# Patient Record
Sex: FEMALE | URBAN - METROPOLITAN AREA
[De-identification: names, ages, dates, MRNs, and addresses within clinical notes are randomized per-mention and may not be internally consistent; named-entity substitution may affect disease eponyms.]

---

## 2020-01-02 ENCOUNTER — TELEPHONE (OUTPATIENT)
Dept: HEMATOLOGY ONCOLOGY | Facility: CLINIC | Age: 65
End: 2020-01-02

## 2020-01-02 NOTE — TELEPHONE ENCOUNTER
New Patient Encounter    New Patient Intake Form   Patient Details:  Blanca Severino  1955  [id number]    Background Information:  [address] starts by opening a telephone encounter and gathering the following information   Who is calling to schedule? If not self, relationship to patient? self   Referring Provider Cristino Chatman [facility])   What is the diagnosis? Multiple abn lab values   Is this diagnosis confirmed NA   Is there a confirmed diagnosis from a biopsy/tissue reviewed by pathology? NA   Is there any prior history of Cancer? NA   If yes, please explain    When was the diagnosis? 11/2019   Is patient aware of diagnosis? Yes   Reason for visit? NP DX   Have you had any testing done? If so: when, where? Yes   Are records in EPIC? Yes, sent to [facility]   Was the patient told to bring a disk? no   Scheduling Information:   Preferred [place]:  Any     Requesting Specific Provider? no   Are there any dates/time the patient cannot be seen? no      Miscellaneous: Pt has Tania Conner  I called provider services, josie guadarramaed that she is in [facility] and can be seen in our office   After completing the above information, please route to Financial Counselor and the appropriate Nurse Navigator for review

## 2020-01-10 NOTE — TELEPHONE ENCOUNTER
I have patient records, they have been scanned to [facility]  I left  for pt to call me to schedule this appt

## 2024-01-25 ENCOUNTER — APPOINTMENT (EMERGENCY)
Dept: CT IMAGING | Facility: HOSPITAL | Age: 69
DRG: 644 | End: 2024-01-25
Payer: COMMERCIAL

## 2024-01-25 ENCOUNTER — HOSPITAL ENCOUNTER (INPATIENT)
Facility: HOSPITAL | Age: 69
LOS: 4 days | Discharge: HOME HEALTH | DRG: 644 | End: 2024-01-29
Attending: EMERGENCY MEDICINE | Admitting: STUDENT IN AN ORGANIZED HEALTH CARE EDUCATION/TRAINING PROGRAM
Payer: COMMERCIAL

## 2024-01-25 DIAGNOSIS — E87.1 HYPONATREMIA: Primary | ICD-10-CM

## 2024-01-25 DIAGNOSIS — K62.5 RECTAL BLEEDING: ICD-10-CM

## 2024-01-25 DIAGNOSIS — E22.2 SIADH (SYNDROME OF INAPPROPRIATE ADH PRODUCTION) (HCC): ICD-10-CM

## 2024-01-25 PROBLEM — R74.8 ABNORMAL LIVER ENZYMES: Status: ACTIVE | Noted: 2024-01-25

## 2024-01-25 PROBLEM — I10 ESSENTIAL HYPERTENSION: Status: ACTIVE | Noted: 2018-01-15

## 2024-01-25 PROBLEM — G43.709 CHRONIC MIGRAINE WITHOUT AURA WITHOUT STATUS MIGRAINOSUS, NOT INTRACTABLE: Status: ACTIVE | Noted: 2018-01-15

## 2024-01-25 PROBLEM — R79.89 ELEVATED TROPONIN LEVEL NOT DUE TO ACUTE CORONARY SYNDROME: Status: ACTIVE | Noted: 2022-06-22

## 2024-01-25 PROBLEM — E87.6 HYPOKALEMIA: Status: ACTIVE | Noted: 2023-03-12

## 2024-01-25 PROBLEM — E55.9 VITAMIN D DEFICIENCY: Status: ACTIVE | Noted: 2022-06-24

## 2024-01-25 PROBLEM — E83.42 HYPOMAGNESEMIA: Status: ACTIVE | Noted: 2023-03-12

## 2024-01-25 PROBLEM — R04.0 EPISTAXIS: Status: ACTIVE | Noted: 2024-01-25

## 2024-01-25 PROBLEM — F41.9 ANXIETY: Status: ACTIVE | Noted: 2018-01-15

## 2024-01-25 PROBLEM — G89.29 CHRONIC PAIN: Status: ACTIVE | Noted: 2018-01-15

## 2024-01-25 LAB
2HR DELTA HS TROPONIN: 4 NG/L
ALBUMIN SERPL BCP-MCNC: 4.1 G/DL (ref 3.5–5)
ALP SERPL-CCNC: 104 U/L (ref 34–104)
ALT SERPL W P-5'-P-CCNC: 51 U/L (ref 7–52)
ANION GAP SERPL CALCULATED.3IONS-SCNC: 15 MMOL/L
ANION GAP SERPL CALCULATED.3IONS-SCNC: 17 MMOL/L
APTT PPP: 32 SECONDS (ref 23–37)
AST SERPL W P-5'-P-CCNC: 133 U/L (ref 13–39)
BASOPHILS # BLD AUTO: 0.01 THOUSANDS/ÂΜL (ref 0–0.1)
BASOPHILS NFR BLD AUTO: 0 % (ref 0–1)
BILIRUB SERPL-MCNC: 1.58 MG/DL (ref 0.2–1)
BNP SERPL-MCNC: 64 PG/ML (ref 0–100)
BUN SERPL-MCNC: 12 MG/DL (ref 5–25)
BUN SERPL-MCNC: 9 MG/DL (ref 5–25)
CALCIUM SERPL-MCNC: 7.9 MG/DL (ref 8.4–10.2)
CALCIUM SERPL-MCNC: 8.6 MG/DL (ref 8.4–10.2)
CARDIAC TROPONIN I PNL SERPL HS: 17 NG/L
CARDIAC TROPONIN I PNL SERPL HS: 21 NG/L
CHLORIDE SERPL-SCNC: 89 MMOL/L (ref 96–108)
CHLORIDE SERPL-SCNC: 92 MMOL/L (ref 96–108)
CO2 SERPL-SCNC: 14 MMOL/L (ref 21–32)
CO2 SERPL-SCNC: 15 MMOL/L (ref 21–32)
CREAT SERPL-MCNC: 0.78 MG/DL (ref 0.6–1.3)
CREAT SERPL-MCNC: 0.89 MG/DL (ref 0.6–1.3)
EOSINOPHIL # BLD AUTO: 0 THOUSAND/ÂΜL (ref 0–0.61)
EOSINOPHIL NFR BLD AUTO: 0 % (ref 0–6)
ERYTHROCYTE [DISTWIDTH] IN BLOOD BY AUTOMATED COUNT: 12.4 % (ref 11.6–15.1)
ETHANOL SERPL-MCNC: 43 MG/DL
FLUAV RNA RESP QL NAA+PROBE: NEGATIVE
FLUBV RNA RESP QL NAA+PROBE: NEGATIVE
GFR SERPL CREATININE-BSD FRML MDRD: 67 ML/MIN/1.73SQ M
GFR SERPL CREATININE-BSD FRML MDRD: 78 ML/MIN/1.73SQ M
GLUCOSE SERPL-MCNC: 105 MG/DL (ref 65–140)
GLUCOSE SERPL-MCNC: 115 MG/DL (ref 65–140)
HCT VFR BLD AUTO: 36 % (ref 34.8–46.1)
HGB BLD-MCNC: 13.1 G/DL (ref 11.5–15.4)
IMM GRANULOCYTES # BLD AUTO: 0.02 THOUSAND/UL (ref 0–0.2)
IMM GRANULOCYTES NFR BLD AUTO: 1 % (ref 0–2)
INR PPP: 1 (ref 0.84–1.19)
LACTATE SERPL-SCNC: 4.4 MMOL/L (ref 0.5–2)
LIPASE SERPL-CCNC: 38 U/L (ref 11–82)
LYMPHOCYTES # BLD AUTO: 0.97 THOUSANDS/ÂΜL (ref 0.6–4.47)
LYMPHOCYTES NFR BLD AUTO: 26 % (ref 14–44)
MCH RBC QN AUTO: 34.4 PG (ref 26.8–34.3)
MCHC RBC AUTO-ENTMCNC: 36.4 G/DL (ref 31.4–37.4)
MCV RBC AUTO: 95 FL (ref 82–98)
MONOCYTES # BLD AUTO: 0.34 THOUSAND/ÂΜL (ref 0.17–1.22)
MONOCYTES NFR BLD AUTO: 9 % (ref 4–12)
NEUTROPHILS # BLD AUTO: 2.39 THOUSANDS/ÂΜL (ref 1.85–7.62)
NEUTS SEG NFR BLD AUTO: 64 % (ref 43–75)
NRBC BLD AUTO-RTO: 0 /100 WBCS
PLATELET # BLD AUTO: 110 THOUSANDS/UL (ref 149–390)
PLATELET # BLD AUTO: 122 THOUSANDS/UL (ref 149–390)
PMV BLD AUTO: 9.1 FL (ref 8.9–12.7)
PMV BLD AUTO: 9.6 FL (ref 8.9–12.7)
POTASSIUM SERPL-SCNC: 3.5 MMOL/L (ref 3.5–5.3)
POTASSIUM SERPL-SCNC: 3.7 MMOL/L (ref 3.5–5.3)
PROT SERPL-MCNC: 6.9 G/DL (ref 6.4–8.4)
PROTHROMBIN TIME: 13.8 SECONDS (ref 11.6–14.5)
RBC # BLD AUTO: 3.81 MILLION/UL (ref 3.81–5.12)
RSV RNA RESP QL NAA+PROBE: NEGATIVE
SARS-COV-2 RNA RESP QL NAA+PROBE: NEGATIVE
SODIUM SERPL-SCNC: 120 MMOL/L (ref 135–147)
SODIUM SERPL-SCNC: 122 MMOL/L (ref 135–147)
TSH SERPL DL<=0.05 MIU/L-ACNC: 2 UIU/ML (ref 0.45–4.5)
WBC # BLD AUTO: 3.73 THOUSAND/UL (ref 4.31–10.16)

## 2024-01-25 PROCEDURE — 80053 COMPREHEN METABOLIC PANEL: CPT | Performed by: EMERGENCY MEDICINE

## 2024-01-25 PROCEDURE — 99284 EMERGENCY DEPT VISIT MOD MDM: CPT | Performed by: EMERGENCY MEDICINE

## 2024-01-25 PROCEDURE — 83880 ASSAY OF NATRIURETIC PEPTIDE: CPT

## 2024-01-25 PROCEDURE — 74176 CT ABD & PELVIS W/O CONTRAST: CPT

## 2024-01-25 PROCEDURE — 85610 PROTHROMBIN TIME: CPT | Performed by: EMERGENCY MEDICINE

## 2024-01-25 PROCEDURE — 96361 HYDRATE IV INFUSION ADD-ON: CPT

## 2024-01-25 PROCEDURE — 84443 ASSAY THYROID STIM HORMONE: CPT

## 2024-01-25 PROCEDURE — 85025 COMPLETE CBC W/AUTO DIFF WBC: CPT | Performed by: EMERGENCY MEDICINE

## 2024-01-25 PROCEDURE — 82077 ASSAY SPEC XCP UR&BREATH IA: CPT

## 2024-01-25 PROCEDURE — 83690 ASSAY OF LIPASE: CPT | Performed by: EMERGENCY MEDICINE

## 2024-01-25 PROCEDURE — 80048 BASIC METABOLIC PNL TOTAL CA: CPT

## 2024-01-25 PROCEDURE — 93005 ELECTROCARDIOGRAM TRACING: CPT

## 2024-01-25 PROCEDURE — 1124F ACP DISCUSS-NO DSCNMKR DOCD: CPT | Performed by: EMERGENCY MEDICINE

## 2024-01-25 PROCEDURE — 94762 N-INVAS EAR/PLS OXIMTRY CONT: CPT

## 2024-01-25 PROCEDURE — 83930 ASSAY OF BLOOD OSMOLALITY: CPT

## 2024-01-25 PROCEDURE — G1004 CDSM NDSC: HCPCS

## 2024-01-25 PROCEDURE — 99223 1ST HOSP IP/OBS HIGH 75: CPT | Performed by: STUDENT IN AN ORGANIZED HEALTH CARE EDUCATION/TRAINING PROGRAM

## 2024-01-25 PROCEDURE — 85049 AUTOMATED PLATELET COUNT: CPT

## 2024-01-25 PROCEDURE — 99285 EMERGENCY DEPT VISIT HI MDM: CPT

## 2024-01-25 PROCEDURE — 83605 ASSAY OF LACTIC ACID: CPT | Performed by: STUDENT IN AN ORGANIZED HEALTH CARE EDUCATION/TRAINING PROGRAM

## 2024-01-25 PROCEDURE — 96374 THER/PROPH/DIAG INJ IV PUSH: CPT

## 2024-01-25 PROCEDURE — 84484 ASSAY OF TROPONIN QUANT: CPT | Performed by: EMERGENCY MEDICINE

## 2024-01-25 PROCEDURE — 85730 THROMBOPLASTIN TIME PARTIAL: CPT | Performed by: EMERGENCY MEDICINE

## 2024-01-25 PROCEDURE — 36415 COLL VENOUS BLD VENIPUNCTURE: CPT | Performed by: EMERGENCY MEDICINE

## 2024-01-25 PROCEDURE — 0241U HB NFCT DS VIR RESP RNA 4 TRGT: CPT | Performed by: EMERGENCY MEDICINE

## 2024-01-25 RX ORDER — METOCLOPRAMIDE HYDROCHLORIDE 5 MG/ML
10 INJECTION INTRAMUSCULAR; INTRAVENOUS EVERY 6 HOURS PRN
Status: DISCONTINUED | OUTPATIENT
Start: 2024-01-25 | End: 2024-01-29 | Stop reason: HOSPADM

## 2024-01-25 RX ORDER — METOPROLOL SUCCINATE 25 MG/1
25 TABLET, EXTENDED RELEASE ORAL 3 TIMES DAILY
Status: DISCONTINUED | OUTPATIENT
Start: 2024-01-25 | End: 2024-01-29 | Stop reason: HOSPADM

## 2024-01-25 RX ORDER — SERTRALINE HYDROCHLORIDE 100 MG/1
100 TABLET, FILM COATED ORAL
COMMUNITY

## 2024-01-25 RX ORDER — CALCIUM CARBONATE 500 MG/1
1000 TABLET, CHEWABLE ORAL DAILY PRN
Status: DISCONTINUED | OUTPATIENT
Start: 2024-01-25 | End: 2024-01-29 | Stop reason: HOSPADM

## 2024-01-25 RX ORDER — ACETAMINOPHEN 325 MG/1
650 TABLET ORAL EVERY 6 HOURS PRN
Status: DISCONTINUED | OUTPATIENT
Start: 2024-01-25 | End: 2024-01-29 | Stop reason: HOSPADM

## 2024-01-25 RX ORDER — METOCLOPRAMIDE HYDROCHLORIDE 5 MG/ML
10 INJECTION INTRAMUSCULAR; INTRAVENOUS ONCE
Status: COMPLETED | OUTPATIENT
Start: 2024-01-25 | End: 2024-01-25

## 2024-01-25 RX ORDER — LEFLUNOMIDE 10 MG/1
10 TABLET ORAL
COMMUNITY

## 2024-01-25 RX ORDER — ONDANSETRON 2 MG/ML
4 INJECTION INTRAMUSCULAR; INTRAVENOUS EVERY 6 HOURS PRN
Status: DISCONTINUED | OUTPATIENT
Start: 2024-01-25 | End: 2024-01-25

## 2024-01-25 RX ORDER — CITALOPRAM 20 MG/1
40 TABLET ORAL DAILY
Status: DISCONTINUED | OUTPATIENT
Start: 2024-01-26 | End: 2024-01-26

## 2024-01-25 RX ORDER — AMLODIPINE BESYLATE 10 MG/1
10 TABLET ORAL DAILY
Status: DISCONTINUED | OUTPATIENT
Start: 2024-01-26 | End: 2024-01-29 | Stop reason: HOSPADM

## 2024-01-25 RX ORDER — FOLIC ACID 1 MG/1
1 TABLET ORAL DAILY
Status: DISCONTINUED | OUTPATIENT
Start: 2024-01-26 | End: 2024-01-29 | Stop reason: HOSPADM

## 2024-01-25 RX ORDER — ACETAMINOPHEN 10 MG/ML
1000 INJECTION, SOLUTION INTRAVENOUS ONCE
Status: COMPLETED | OUTPATIENT
Start: 2024-01-25 | End: 2024-01-25

## 2024-01-25 RX ORDER — ECHINACEA PURPUREA EXTRACT 125 MG
1 TABLET ORAL
Status: DISCONTINUED | OUTPATIENT
Start: 2024-01-25 | End: 2024-01-29 | Stop reason: HOSPADM

## 2024-01-25 RX ORDER — CITALOPRAM 40 MG/1
40 TABLET ORAL DAILY
COMMUNITY
Start: 2023-09-12 | End: 2024-01-29

## 2024-01-25 RX ORDER — AMLODIPINE BESYLATE 10 MG/1
10 TABLET ORAL DAILY
COMMUNITY
Start: 2023-09-12

## 2024-01-25 RX ORDER — PANTOPRAZOLE SODIUM 40 MG/1
1 TABLET, DELAYED RELEASE ORAL DAILY
COMMUNITY
Start: 2023-08-17

## 2024-01-25 RX ORDER — BUTALBITAL, ACETAMINOPHEN AND CAFFEINE 50; 325; 40 MG/1; MG/1; MG/1
1 TABLET ORAL EVERY 6 HOURS PRN
Status: ACTIVE | OUTPATIENT
Start: 2024-01-25 | End: 2024-01-26

## 2024-01-25 RX ORDER — SOD.CHLORID/POTASSIUM CHLORIDE 287-180-15
2 TABLET ORAL 2 TIMES DAILY
COMMUNITY
Start: 2023-11-13 | End: 2024-01-29

## 2024-01-25 RX ORDER — DOCUSATE SODIUM 100 MG/1
100 CAPSULE, LIQUID FILLED ORAL 2 TIMES DAILY
Status: DISCONTINUED | OUTPATIENT
Start: 2024-01-25 | End: 2024-01-29

## 2024-01-25 RX ORDER — ENOXAPARIN SODIUM 100 MG/ML
40 INJECTION SUBCUTANEOUS DAILY
Status: DISCONTINUED | OUTPATIENT
Start: 2024-01-26 | End: 2024-01-29 | Stop reason: HOSPADM

## 2024-01-25 RX ORDER — LANOLIN ALCOHOL/MO/W.PET/CERES
100 CREAM (GRAM) TOPICAL DAILY
Status: DISCONTINUED | OUTPATIENT
Start: 2024-01-26 | End: 2024-01-29 | Stop reason: HOSPADM

## 2024-01-25 RX ORDER — SERTRALINE HYDROCHLORIDE 100 MG/1
100 TABLET, FILM COATED ORAL
Status: DISCONTINUED | OUTPATIENT
Start: 2024-01-25 | End: 2024-01-29 | Stop reason: HOSPADM

## 2024-01-25 RX ORDER — METOPROLOL SUCCINATE 25 MG/1
25 TABLET, EXTENDED RELEASE ORAL 3 TIMES DAILY
COMMUNITY
Start: 2024-01-02 | End: 2024-04-01

## 2024-01-25 RX ORDER — PANTOPRAZOLE SODIUM 40 MG/1
40 TABLET, DELAYED RELEASE ORAL DAILY
Status: DISCONTINUED | OUTPATIENT
Start: 2024-01-26 | End: 2024-01-29 | Stop reason: HOSPADM

## 2024-01-25 RX ORDER — SODIUM CHLORIDE 1 G/1
1 TABLET ORAL
Status: DISCONTINUED | OUTPATIENT
Start: 2024-01-26 | End: 2024-01-26

## 2024-01-25 RX ORDER — BUTALBITAL, ACETAMINOPHEN AND CAFFEINE 50; 325; 40 MG/1; MG/1; MG/1
1 TABLET ORAL EVERY 6 HOURS PRN
COMMUNITY
Start: 2024-01-02 | End: 2024-01-29

## 2024-01-25 RX ADMIN — ACETAMINOPHEN 1000 MG: 10 INJECTION INTRAVENOUS at 19:15

## 2024-01-25 RX ADMIN — SODIUM CHLORIDE 1000 ML: 0.9 INJECTION, SOLUTION INTRAVENOUS at 19:06

## 2024-01-25 RX ADMIN — METOCLOPRAMIDE HYDROCHLORIDE 10 MG: 5 INJECTION INTRAMUSCULAR; INTRAVENOUS at 21:11

## 2024-01-25 NOTE — ED PROVIDER NOTES
"History  Chief Complaint   Patient presents with    Rectal Bleeding     Patient reports nose bleed for 1 week, with some this AM and reports new onset of dark red blood mixed with their stool. Patient reports \"every now and then I get a little blood\" but reports never seeing anyone for it. Patient reports fatigue and sore throat.      Patient is a 68-year-old female past medical history of hypertension presenting with rectal bleeding.  Patient notes right red blood per rectum 1 time today starting 2 hours ago.  States that she has had intermittent bleeding from the bilateral naris for the last week and some small from the left nare today.  Notes fatigue over the last 1 to 2 days as well as sore throat.  Notes nausea and 2 episodes of nonbloody nonbilious vomit today.  Denies any chest pain, shortness of breath, dizziness, rashes, vision changes, dysuria, fevers.  Denies any abdominal pain but does note rectal pain currently.        None       No past medical history on file.    No past surgical history on file.    No family history on file.  I have reviewed and agree with the history as documented.    E-Cigarette/Vaping    E-Cigarette Use Never User      E-Cigarette/Vaping Substances     Social History     Tobacco Use    Smoking status: Never    Smokeless tobacco: Never   Vaping Use    Vaping status: Never Used   Substance Use Topics    Alcohol use: Yes     Comment: social    Drug use: Never       Review of Systems   All other systems reviewed and are negative.      Physical Exam  Physical Exam  Vitals reviewed.   Constitutional:       General: She is not in acute distress.     Appearance: Normal appearance. She is not ill-appearing.   HENT:      Nose: Nose normal.      Mouth/Throat:      Mouth: Mucous membranes are moist.      Pharynx: No oropharyngeal exudate or posterior oropharyngeal erythema.   Eyes:      Conjunctiva/sclera: Conjunctivae normal.   Cardiovascular:      Rate and Rhythm: Normal rate and regular " rhythm.      Pulses: Normal pulses.      Heart sounds: Normal heart sounds.   Pulmonary:      Effort: Pulmonary effort is normal.      Breath sounds: Normal breath sounds.   Abdominal:      General: Abdomen is flat.      Palpations: Abdomen is soft.      Tenderness: There is no abdominal tenderness. There is no right CVA tenderness or left CVA tenderness.   Musculoskeletal:         General: No swelling. Normal range of motion.      Cervical back: Neck supple.   Skin:     General: Skin is warm and dry.   Neurological:      General: No focal deficit present.      Mental Status: She is alert.   Psychiatric:         Mood and Affect: Mood normal.         Vital Signs  ED Triage Vitals [01/25/24 1815]   Temperature Pulse Respirations Blood Pressure SpO2   98.4 °F (36.9 °C) 104 18 118/67 97 %      Temp src Heart Rate Source Patient Position - Orthostatic VS BP Location FiO2 (%)   -- -- -- -- --      Pain Score       --           Vitals:    01/25/24 1815   BP: 118/67   Pulse: 104         Visual Acuity      ED Medications  Medications   sodium chloride 0.9 % bolus 1,000 mL (has no administration in time range)   acetaminophen (Ofirmev) injection 1,000 mg (has no administration in time range)       Diagnostic Studies  Results Reviewed       None                   No orders to display              Procedures  ECG 12 Lead Documentation Only    Date/Time: 1/25/2024 7:21 PM    Performed by: Rox Gardiner DO  Authorized by: Rox Gardiner DO    Patient location:  ED  Previous ECG:     Previous ECG:  Unavailable  Interpretation:     Interpretation: normal    Rate:     ECG rate assessment: normal    Rhythm:     Rhythm: sinus rhythm    Ectopy:     Ectopy: none    QRS:     QRS axis:  Normal    QRS intervals:  Normal  Conduction:     Conduction: normal    ST segments:     ST segments:  Normal  T waves:     T waves: normal    Other findings:     Other findings: prolonged qTc interval             ED Course                                              Medical Decision Making  Patient is a 68-year-old female past medical history of hypertension presenting with rectal bleeding.  Patient is well-appearing at bedside with stable vitals and in no acute distress.  She has no abdominal tenderness, no CVA tenderness, benign HEENT exam and no other significant physical exam findings.  Will obtain labs to assess for electrolyte abnormalities, anemia, coagulopathies, CT to assess for intra-abdominal pathology such as diverticulitis, appendicitis, patient has allergy to contrast dye as well as allergy to prednisone and diphenhydramine therefore will obtain CT without contrast.  Will give pain control and reassess.    Amount and/or Complexity of Data Reviewed  Labs: ordered.  Radiology: ordered.    Risk  Prescription drug management.             Disposition  Final diagnoses:   None     ED Disposition       None          Follow-up Information    None         Patient's Medications    No medications on file       No discharge procedures on file.    PDMP Review       None            ED Provider  Electronically Signed by             Rox Gardiner DO  01/25/24 8503

## 2024-01-26 LAB
4HR DELTA HS TROPONIN: 16 NG/L
ANION GAP SERPL CALCULATED.3IONS-SCNC: 13 MMOL/L
ANION GAP SERPL CALCULATED.3IONS-SCNC: 13 MMOL/L
ATRIAL RATE: 93 BPM
BUN SERPL-MCNC: 4 MG/DL (ref 5–25)
BUN SERPL-MCNC: 5 MG/DL (ref 5–25)
CALCIUM SERPL-MCNC: 8.2 MG/DL (ref 8.4–10.2)
CALCIUM SERPL-MCNC: 8.6 MG/DL (ref 8.4–10.2)
CARDIAC TROPONIN I PNL SERPL HS: 33 NG/L
CHLORIDE SERPL-SCNC: 89 MMOL/L (ref 96–108)
CHLORIDE SERPL-SCNC: 89 MMOL/L (ref 96–108)
CO2 SERPL-SCNC: 21 MMOL/L (ref 21–32)
CO2 SERPL-SCNC: 21 MMOL/L (ref 21–32)
CREAT SERPL-MCNC: 0.7 MG/DL (ref 0.6–1.3)
CREAT SERPL-MCNC: 0.73 MG/DL (ref 0.6–1.3)
CREAT UR-MCNC: 50.3 MG/DL
GFR SERPL CREATININE-BSD FRML MDRD: 85 ML/MIN/1.73SQ M
GFR SERPL CREATININE-BSD FRML MDRD: 89 ML/MIN/1.73SQ M
GLUCOSE SERPL-MCNC: 139 MG/DL (ref 65–140)
GLUCOSE SERPL-MCNC: 145 MG/DL (ref 65–140)
LACTATE SERPL-SCNC: 1.6 MMOL/L (ref 0.5–2)
OSMOLALITY UR/SERPL-RTO: 288 MMOL/KG (ref 282–298)
OSMOLALITY UR: 423 MMOL/KG
P AXIS: 66 DEGREES
POTASSIUM SERPL-SCNC: 2.8 MMOL/L (ref 3.5–5.3)
POTASSIUM SERPL-SCNC: 3.2 MMOL/L (ref 3.5–5.3)
PR INTERVAL: 182 MS
QRS AXIS: 58 DEGREES
QRSD INTERVAL: 92 MS
QT INTERVAL: 400 MS
QTC INTERVAL: 497 MS
SODIUM 24H UR-SCNC: 73 MOL/L
SODIUM SERPL-SCNC: 123 MMOL/L (ref 135–147)
SODIUM SERPL-SCNC: 123 MMOL/L (ref 135–147)
T WAVE AXIS: 47 DEGREES
VENTRICULAR RATE: 93 BPM

## 2024-01-26 PROCEDURE — 93010 ELECTROCARDIOGRAM REPORT: CPT | Performed by: INTERNAL MEDICINE

## 2024-01-26 PROCEDURE — 83935 ASSAY OF URINE OSMOLALITY: CPT | Performed by: STUDENT IN AN ORGANIZED HEALTH CARE EDUCATION/TRAINING PROGRAM

## 2024-01-26 PROCEDURE — 80048 BASIC METABOLIC PNL TOTAL CA: CPT

## 2024-01-26 PROCEDURE — 94762 N-INVAS EAR/PLS OXIMTRY CONT: CPT

## 2024-01-26 PROCEDURE — 99232 SBSQ HOSP IP/OBS MODERATE 35: CPT | Performed by: INTERNAL MEDICINE

## 2024-01-26 PROCEDURE — 84300 ASSAY OF URINE SODIUM: CPT | Performed by: STUDENT IN AN ORGANIZED HEALTH CARE EDUCATION/TRAINING PROGRAM

## 2024-01-26 PROCEDURE — 83605 ASSAY OF LACTIC ACID: CPT | Performed by: STUDENT IN AN ORGANIZED HEALTH CARE EDUCATION/TRAINING PROGRAM

## 2024-01-26 PROCEDURE — 97167 OT EVAL HIGH COMPLEX 60 MIN: CPT

## 2024-01-26 PROCEDURE — 87505 NFCT AGENT DETECTION GI: CPT

## 2024-01-26 PROCEDURE — 97163 PT EVAL HIGH COMPLEX 45 MIN: CPT

## 2024-01-26 PROCEDURE — 87493 C DIFF AMPLIFIED PROBE: CPT

## 2024-01-26 PROCEDURE — 84484 ASSAY OF TROPONIN QUANT: CPT

## 2024-01-26 PROCEDURE — 82570 ASSAY OF URINE CREATININE: CPT

## 2024-01-26 PROCEDURE — 99223 1ST HOSP IP/OBS HIGH 75: CPT | Performed by: INTERNAL MEDICINE

## 2024-01-26 RX ORDER — LANOLIN ALCOHOL/MO/W.PET/CERES
6 CREAM (GRAM) TOPICAL
Status: DISCONTINUED | OUTPATIENT
Start: 2024-01-26 | End: 2024-01-29 | Stop reason: HOSPADM

## 2024-01-26 RX ORDER — SODIUM CHLORIDE 1 G/1
3 TABLET ORAL
Status: DISCONTINUED | OUTPATIENT
Start: 2024-01-26 | End: 2024-01-29 | Stop reason: HOSPADM

## 2024-01-26 RX ORDER — FUROSEMIDE 10 MG/ML
20 INJECTION INTRAMUSCULAR; INTRAVENOUS ONCE
Status: COMPLETED | OUTPATIENT
Start: 2024-01-26 | End: 2024-01-26

## 2024-01-26 RX ORDER — POTASSIUM CHLORIDE 20 MEQ/1
40 TABLET, EXTENDED RELEASE ORAL ONCE
Status: COMPLETED | OUTPATIENT
Start: 2024-01-26 | End: 2024-01-26

## 2024-01-26 RX ADMIN — DOCUSATE SODIUM 100 MG: 100 CAPSULE, LIQUID FILLED ORAL at 00:19

## 2024-01-26 RX ADMIN — METOPROLOL SUCCINATE 25 MG: 25 TABLET, EXTENDED RELEASE ORAL at 22:32

## 2024-01-26 RX ADMIN — CITALOPRAM HYDROBROMIDE 40 MG: 20 TABLET ORAL at 09:48

## 2024-01-26 RX ADMIN — METOCLOPRAMIDE HYDROCHLORIDE 10 MG: 5 INJECTION INTRAMUSCULAR; INTRAVENOUS at 12:29

## 2024-01-26 RX ADMIN — SERTRALINE 100 MG: 100 TABLET, FILM COATED ORAL at 22:32

## 2024-01-26 RX ADMIN — FUROSEMIDE 20 MG: 10 INJECTION, SOLUTION INTRAMUSCULAR; INTRAVENOUS at 17:40

## 2024-01-26 RX ADMIN — POTASSIUM CHLORIDE 40 MEQ: 1500 TABLET, EXTENDED RELEASE ORAL at 09:51

## 2024-01-26 RX ADMIN — PANTOPRAZOLE SODIUM 40 MG: 40 TABLET, DELAYED RELEASE ORAL at 09:49

## 2024-01-26 RX ADMIN — METOPROLOL SUCCINATE 25 MG: 25 TABLET, EXTENDED RELEASE ORAL at 09:49

## 2024-01-26 RX ADMIN — Medication 3 G: at 09:51

## 2024-01-26 RX ADMIN — THIAMINE HCL TAB 100 MG 100 MG: 100 TAB at 09:49

## 2024-01-26 RX ADMIN — METOCLOPRAMIDE HYDROCHLORIDE 10 MG: 5 INJECTION INTRAMUSCULAR; INTRAVENOUS at 00:22

## 2024-01-26 RX ADMIN — DOCUSATE SODIUM 100 MG: 100 CAPSULE, LIQUID FILLED ORAL at 09:49

## 2024-01-26 RX ADMIN — Medication 6 MG: at 01:58

## 2024-01-26 RX ADMIN — DOCUSATE SODIUM 100 MG: 100 CAPSULE, LIQUID FILLED ORAL at 17:39

## 2024-01-26 RX ADMIN — ENOXAPARIN SODIUM 40 MG: 40 INJECTION SUBCUTANEOUS at 09:48

## 2024-01-26 RX ADMIN — METOCLOPRAMIDE HYDROCHLORIDE 10 MG: 5 INJECTION INTRAMUSCULAR; INTRAVENOUS at 05:45

## 2024-01-26 RX ADMIN — FOLIC ACID 1 MG: 1 TABLET ORAL at 09:49

## 2024-01-26 RX ADMIN — METOPROLOL SUCCINATE 25 MG: 25 TABLET, EXTENDED RELEASE ORAL at 17:39

## 2024-01-26 RX ADMIN — Medication 3 G: at 17:39

## 2024-01-26 RX ADMIN — METOPROLOL SUCCINATE 25 MG: 25 TABLET, EXTENDED RELEASE ORAL at 00:19

## 2024-01-26 RX ADMIN — Medication 6 MG: at 22:36

## 2024-01-26 RX ADMIN — AMLODIPINE BESYLATE 10 MG: 10 TABLET ORAL at 09:49

## 2024-01-26 RX ADMIN — Medication 3 G: at 22:32

## 2024-01-26 RX ADMIN — SERTRALINE 100 MG: 100 TABLET, FILM COATED ORAL at 00:19

## 2024-01-26 RX ADMIN — Medication 1 TABLET: at 09:49

## 2024-01-26 RX ADMIN — METOCLOPRAMIDE HYDROCHLORIDE 10 MG: 5 INJECTION INTRAMUSCULAR; INTRAVENOUS at 19:33

## 2024-01-26 NOTE — ASSESSMENT & PLAN NOTE
On NA replacement tabs, reports compliance with medication regime  Patient does not currently follow outpatient nephrology  Plan as above

## 2024-01-26 NOTE — PROGRESS NOTES
[facility]  Progress Note  Name: Jazmin Jean I  MRN: [id number]  Unit/Bed#: -Anjana I Date of Admission: 2024   Date of Service: 2024 I Hospital Day: 1    Assessment/Plan   * Hyponatremia  Assessment & Plan    Background: patient reports feeling general malaise, weakness for about 1.5 weeks. Was treated recently for URI, denies fever, chills, n/v, chest pain  Does have history of SIADH  Continue with salt tabs and fluid restriction for now  Follow-up nephrology recommendations  Continue with serial BMPs          Abnormal liver enzymes  Assessment & Plan  T. bili noted to be 1.5  Unclear etiology  Will trend for now    SIADH (syndrome of inappropriate ADH production) (HCC)  Assessment & Plan  On NA replacement tabs, reports compliance with medication regime  Follow-up nephrology recommendation    Essential hypertension  Assessment & Plan  BP remains controlled  Continue metoprolol and amlodipine         VTE Pharmacologic Prophylaxis:   Pharmacologic: Enoxaparin (Lovenox)  Mechanical VTE Prophylaxis in Place: Yes    Patient Centered Rounds: I have performed bedside rounds with nursing staff today.    Discussions with Specialists or Other Care Team Provider: cm, nursing    Education and Discussions with Family / Patient: pt, declined family update    Time Spent for Care: 30 minutes.  More than 50% of total time spent on counseling and coordination of care as described above.    Current Length of Stay: 1 day(s)    Current Patient Status: Inpatient   Certification Statement: The patient will continue to require additional inpatient hospital stay due to see below    Discharge Plan: Pending improvement of hyponatremia.  Anticipate approximately 48 hours    Code Status: Level 1 - Full Code      Subjective:   Currently denies any acute complaints.  Denies nausea, vomiting, abdominal pain, fevers    Objective:     Vitals:   Temp (24hrs), Av.2 °F (36.8 °C), Min:97.9 °F (36.6 °C),  Max:98.4 °F (36.9 °C)    Temp:  [97.9 °F (36.6 °C)-98.4 °F (36.9 °C)] 98 °F (36.7 °C)  HR:  [] 95  Resp:  [18-22] 18  BP: (118-158)/(62-84) 141/79  SpO2:  [95 %-98 %] 96 %  Body mass index is 36.14 kg/m².     Input and Output Summary (last 24 hours):       Intake/Output Summary (Last 24 hours) at 1/26/2024 1049  Last data filed at 1/26/2024 0900  Gross per 24 hour   Intake 1080 ml   Output 25 ml   Net 1055 ml       Physical Exam:     Physical Exam  Constitutional:       General: She is not in acute distress.     Appearance: She is well-developed. She is not diaphoretic.   HENT:      Head: Normocephalic and atraumatic.      Nose: Nose normal.      Mouth/Throat:      Pharynx: No oropharyngeal exudate.   Eyes:      General: No scleral icterus.        Right eye: No discharge.         Left eye: No discharge.      Conjunctiva/sclera: Conjunctivae normal.   Neck:      Thyroid: No thyromegaly.      Vascular: No JVD.   Cardiovascular:      Rate and Rhythm: Normal rate and regular rhythm.      Heart sounds: Normal heart sounds. No murmur heard.     No friction rub. No gallop.   Pulmonary:      Effort: Pulmonary effort is normal. No respiratory distress.      Breath sounds: Normal breath sounds. No wheezing or rales.   Chest:      Chest wall: No tenderness.   Abdominal:      General: Bowel sounds are normal. There is no distension.      Palpations: Abdomen is soft.      Tenderness: There is no abdominal tenderness. There is no guarding or rebound.   Musculoskeletal:         General: No tenderness or deformity. Normal range of motion.      Cervical back: Normal range of motion and neck supple.   Skin:     General: Skin is warm and dry.      Findings: No erythema or rash.   Neurological:      Mental Status: She is alert. Mental status is at baseline.      Cranial Nerves: No cranial nerve deficit.      Sensory: No sensory deficit.      Motor: No abnormal muscle tone.      Coordination: Coordination normal.          Additional Data:     Labs:    Results from last 7 days   Lab Units 01/25/24 2206 01/25/24  1906   WBC Thousand/uL  --  3.73*   HEMOGLOBIN g/dL  --  13.1   HEMATOCRIT %  --  36.0   PLATELETS Thousands/uL 110* 122*   NEUTROS PCT %  --  64   LYMPHS PCT %  --  26   MONOS PCT %  --  9   EOS PCT %  --  0     Results from last 7 days   Lab Units 01/26/24  0835 01/25/24 2206 01/25/24  1906   SODIUM mmol/L 123*   < > 120*   POTASSIUM mmol/L 3.2*   < > 3.7   CHLORIDE mmol/L 89*   < > 89*   CO2 mmol/L 21   < > 14*   BUN mg/dL 5   < > 12   CREATININE mg/dL 0.73   < > 0.89   ANION GAP mmol/L 13   < > 17   CALCIUM mg/dL 8.2*   < > 8.6   ALBUMIN g/dL  --   --  4.1   TOTAL BILIRUBIN mg/dL  --   --  1.58*   ALK PHOS U/L  --   --  104   ALT U/L  --   --  51   AST U/L  --   --  133*   GLUCOSE RANDOM mg/dL 139   < > 115    < > = values in this interval not displayed.     Results from last 7 days   Lab Units 01/25/24  1906   INR  1.00             Results from last 7 days   Lab Units 01/26/24  0048 01/25/24  2052   LACTIC ACID mmol/L 1.6 4.4*           * I Have Reviewed All Lab Data Listed Above.  * Additional Pertinent Lab Tests Reviewed: All Labs Within Last 24 Hours Reviewed    Imaging:    Imaging Reports Reviewed Today Include: na  Imaging Personally Reviewed by Myself Includes:  na    Recent Cultures (last 7 days):           Last 24 Hours Medication List:   Current Facility-Administered Medications   Medication Dose Route Frequency Provider Last Rate    acetaminophen  650 mg Oral Q6H PRN RANDAL Anders      amLODIPine  10 mg Oral Daily RANDAL Anders      butalbital-acetaminophen-caffeine  1 tablet Oral Q6H PRN Vy Edilma-Giovanni, RANDAL      calcium carbonate  1,000 mg Oral Daily PRN RANDAL Anders      docusate sodium  100 mg Oral BID RANDAL Anders      enoxaparin  40 mg Subcutaneous Daily RANDAL Anders      folic acid  1 mg Oral Daily  RANDAL Anders      furosemide  20 mg Intravenous Once Shadi Young MD      melatonin  6 mg Oral HS PRN Claudette Glover PA-C      metoclopramide  10 mg Intravenous Q6H PRN RANDAL Anders      metoprolol succinate  25 mg Oral TID RANDAL Anders      multivitamin-minerals  1 tablet Oral Daily RANDAL Anders      pantoprazole  40 mg Oral Daily RANDAL Anders      sertraline  100 mg Oral HS RANDAL Anders      sodium chloride  1 spray Each Nare Q1H PRN RANDAL Anders      sodium chloride  3 g Oral 4x Daily (with meals and at bedtime) Shadi Young MD      thiamine  100 mg Oral Daily RANDAL Anders          Today, Patient Was Seen By: Lee Thurman MD    ** Please Note: Dictation voice to text software may have been used in the creation of this document. **

## 2024-01-26 NOTE — ASSESSMENT & PLAN NOTE
Lab Results   Component Value Date     (H) 01/25/2024    ALKPHOS 104 01/25/2024    ALT 51 01/25/2024    TBILI 1.58 (H) 01/25/2024     CT abdomen: Hepatic steatosis   Concern for ETOH abuse  Ethanol pending  [facility] protocol

## 2024-01-26 NOTE — ASSESSMENT & PLAN NOTE
Reports intermittent nose bleeds over the past week  Hgb stable at 13.1  Monitor  PRN Saline nasal spray

## 2024-01-26 NOTE — PLAN OF CARE
Problem: PHYSICAL THERAPY ADULT  Goal: Performs mobility at highest level of function for planned discharge setting.  See evaluation for individualized goals.  Description: Treatment/Interventions: Functional transfer training, LE strengthening/ROM, ADL retraining, Therapeutic exercise, Endurance training, Gait training, Bed mobility, Spoke to nursing, OT          See flowsheet documentation for full assessment, interventions and recommendations.  Outcome: Progressing  Note: Prognosis: Good  Problem List: Impaired balance, Decreased mobility, Decreased strength, Decreased endurance, Pain  Assessment: Pt is 68 y.o. female seen for PT evaluation s/p admit to [facility] on 1/25/2024 w/ Hyponatremia. PT consulted to assess pt's functional mobility and d/c needs. Order placed for PT eval and tx, w/ up w/ A order. Comorbidities affecting pt's physical performance at time of assessment include: hyponatremia, BRBPR, SIADH, HTN, chronic migraine, anxiety. PTA, pt was independent w/ all functional mobility w/ no device, ambulates community distances and elevations, ambulates household distances, lives w/ boyfriend in one level apartment, and retired. Personal factors affecting pt at time of IE include: ambulating w/ assistive device, inability to navigate community distances, inability to navigate level surfaces w/o external assistance, unable to perform dynamic tasks in community, positive fall history, inability to perform IADLs, and inability to perform ADLs. Please find objective findings from PT assessment regarding body systems outlined above with impairments and limitations including weakness, impaired balance, decreased endurance, gait deviations, pain, decreased activity tolerance, decreased functional mobility tolerance, and fall risk. The following objective measures performed on IE also reveal limitations: Barthel Index: 45/100, Modified [place]: 4 (moderate/severe disability), and AM-PAC 6-Clicks: 15/24.  Pt's clinical presentation is currently unstable/unpredictable seen in pt's presentation of continued need for medical management and monitoring, decreased strength and balance resulting in an increased risk for falls, decreased endurance and activity tolerance limiting overall mobility. Pt to benefit from continued PT tx to address deficits as defined above and maximize level of functional independent mobility and consistency. From PT/mobility standpoint, recommendation at time of d/c would be level 2 (moderate resource intensity) pending progress in order to facilitate return to PLOF.  Barriers to Discharge: Inaccessible home environment     Rehab Resource Intensity Level, PT: II (Moderate Resource Intensity)    See flowsheet documentation for full assessment.

## 2024-01-26 NOTE — ASSESSMENT & PLAN NOTE
Reports 1x day for BRBPR, patient denies any other associated symptoms  CT abdomen: No evidence of proctitis or perirectal inflammation. No findings to indicate diverticulitis or colitis   Hgb stable at 13.1,   PT/PTT wnl  GI consult recommendation appreciate

## 2024-01-26 NOTE — PHYSICAL THERAPY NOTE
Physical Therapy Evaluation     Patient's Name: Jazmin Jean    Admitting Diagnosis  SIADH (syndrome of inappropriate ADH production) ([facility]) [E22.2]  Hyponatremia [E87.1]  Rectal bleeding [K62.5]    Problem List  Patient Active Problem List   Diagnosis    Anxiety    Chronic migraine without aura without status migrainosus, not intractable    Chronic pain    Elevated troponin level not due to acute coronary syndrome    Essential hypertension    Hypokalemia    Hypomagnesemia    Hyponatremia    SIADH (syndrome of inappropriate ADH production) ([facility])    Vitamin D deficiency    BRBPR (bright red blood per rectum)    Epistaxis    Abnormal liver enzymes       Past Medical History  History reviewed. No pertinent past medical history.    Past Surgical History  Past Surgical History:   Procedure Laterality Date    ABDOMINAL SURGERY      EYE SURGERY      JOINT REPLACEMENT          01/26/24 0844   PT Last Visit   PT Visit Date 01/26/24   Note Type   Note type Evaluation   Pain Assessment   Pain Assessment Tool 0-10   Pain Score 8   Pain Location/Orientation Location: Head   Pain Onset/Description Onset: Ongoing;Frequency: Constant/Continuous   Restrictions/Precautions   Weight Bearing Precautions Per Order No   Braces or Orthoses Other (Comment)  (none reported)   Other Precautions Fall Risk;Pain   Home Living   Type of Home Apartment   Home Layout One level;Performs ADLs on one level;Able to live on main level with bedroom/bathroom;Ramped entrance   Bathroom Shower/Tub Tub/shower unit   Bathroom Toilet Standard   Bathroom Equipment Grab bars in shower;Shower chair   Bathroom Accessibility Accessible   Home Equipment Other (Comment)  (none reported)   Additional Comments Ambulates without device   Prior Function   Level of [place] Independent with functional mobility;Independent with ADLs;Needs assistance with IADLS   Lives With Significant other  (boyfriend)   Receives Help From Family   IADLs Family/Friend/Other provides  "transportation;Family/Friend/Other provides meals;Independent with medication management   Falls in the last 6 months 1 to 4  (1 fall)   Vocational Retired   General   Family/Caregiver Present No   Cognition   Overall Cognitive Status WFL   Arousal/Participation Alert   Orientation Level Oriented X4   Memory Within functional limits   Following Commands Follows all commands and directions without difficulty   Comments Pt agreeable to PT   RLE Assessment   RLE Assessment X   Strength RLE   RLE Overall Strength 4/5   LLE Assessment   LLE Assessment X   Strength LLE   LLE Overall Strength 4/5   Bed Mobility   Supine to Sit 4  Minimal assistance   Additional items Assist x 1;Bedrails;HOB elevated;Increased time required;LE management;Verbal cues   Sit to Supine 4  Minimal assistance   Additional items Assist x 1;HOB elevated;Bedrails;Increased time required;Verbal cues;LE management   Additional Comments Pt received supine in bed with HOB elevated. Following session, pt returned to bed and remained with needs met, call bell within reach   Transfers   Sit to Stand 4  Minimal assistance   Additional items Assist x 2;Armrests;Increased time required;Verbal cues   Stand to Sit 4  Minimal assistance   Additional items Assist x 2;Armrests;Increased time required;Verbal cues   Additional Comments with use of RW due to patient reports of feeling \"shaky\" with all mobility   Ambulation/Elevation   Gait pattern Decreased foot clearance;Inconsistent yung;Short stride;Decreased heel strike   Gait Assistance 4  Minimal assist   Additional items Assist x 2;Verbal cues   Assistive Device Rolling walker   Distance 15'   Stair Management Assistance Not tested   Balance   Static Sitting Fair   Dynamic Sitting Fair -   Static Standing Fair -   Dynamic Standing Poor +   Ambulatory Poor +   Endurance Deficit   Endurance Deficit Yes   Endurance Deficit Description decreased activity tolerance   Activity Tolerance   Activity Tolerance " Patient limited by fatigue   Medical Staff Made Aware Co-evaluation with BIB Henderson due to medical stability and clinical presentation   Nurse Made Aware RN Flak[place]   Assessment   Prognosis Good   Problem List Impaired balance;Decreased mobility;Decreased strength;Decreased endurance;Pain   Assessment Pt is 68 y.o. female seen for PT evaluation s/p admit to [facility] on 1/25/2024 w/ Hyponatremia. PT consulted to assess pt's functional mobility and d/c needs. Order placed for PT eval and tx, w/ up w/ A order. Comorbidities affecting pt's physical performance at time of assessment include: hyponatremia, BRBPR, SIADH, HTN, chronic migraine, anxiety. PTA, pt was independent w/ all functional mobility w/ no device, ambulates community distances and elevations, ambulates household distances, lives w/ boyfriend in one level apartment, and retired. Personal factors affecting pt at time of IE include: ambulating w/ assistive device, inability to navigate community distances, inability to navigate level surfaces w/o external assistance, unable to perform dynamic tasks in community, positive fall history, inability to perform IADLs, and inability to perform ADLs. Please find objective findings from PT assessment regarding body systems outlined above with impairments and limitations including weakness, impaired balance, decreased endurance, gait deviations, pain, decreased activity tolerance, decreased functional mobility tolerance, and fall risk. The following objective measures performed on IE also reveal limitations: Barthel Index: 45/100, Modified [place]: 4 (moderate/severe disability), and AM-PAC 6-Clicks: 15/24. Pt's clinical presentation is currently unstable/unpredictable seen in pt's presentation of continued need for medical management and monitoring, decreased strength and balance resulting in an increased risk for falls, decreased endurance and activity tolerance limiting overall mobility. Pt to benefit from  continued PT tx to address deficits as defined above and maximize level of functional independent mobility and consistency. From PT/mobility standpoint, recommendation at time of d/c would be level 2 (moderate resource intensity) pending progress in order to facilitate return to PLOF.   Barriers to Discharge Inaccessible home environment   Goals   STG Expiration Date 02/05/24   Short Term Goal #1 In 7-10 days: Increase bilateral LE strength 1/2 grade to facilitate independent mobility, Perform all bed mobility tasks modified independent to decrease caregiver burden, Perform all transfers modified independent to improve independence, Ambulate > 50 ft. with least restrictive assistive device modified independent w/o LOB and w/ normalized gait pattern 100% of the time, and Tolerate standing 10 minutes to facilitate functional task performance   Plan   Treatment/Interventions Functional transfer training;LE strengthening/ROM;ADL retraining;Therapeutic exercise;Endurance training;Gait training;Bed mobility;Spoke to nursing;OT   PT Frequency 3-5x/wk   Discharge Recommendation   Rehab Resource Intensity Level, PT II (Moderate Resource Intensity)   AM-PAC Basic Mobility Inpatient   Turning in Flat Bed Without Bedrails 3   Lying on Back to Sitting on Edge of Flat Bed Without Bedrails 3   Moving Bed to Chair 3   Standing Up From Chair Using Arms 3   Walk in Room 2   Climb 3-5 Stairs With Railing 1   Basic Mobility Inpatient Raw Score 15   Basic Mobility Standardized Score 36.97   Highest Level Of Mobility   -HLM Goal 4: Move to chair/commode   JH-HLM Achieved 6: Walk 10 steps or more   Modified [place] Scale   Modified Remy Scale 4   Barthel Index   Feeding 10   Bathing 0   Grooming Score 0   Dressing Score 5   Bladder Score 10   Bowels Score 10   Toilet Use Score 5   Transfers (Bed/Chair) Score 5   Mobility (Level Surface) Score 0   Stairs Score 0   Barthel Index Score 45       Loli Goodman, PT

## 2024-01-26 NOTE — PLAN OF CARE
Problem: OCCUPATIONAL THERAPY ADULT  Goal: Performs self-care activities at highest level of function for planned discharge setting.  See evaluation for individualized goals.  Description: Treatment Interventions: ADL retraining, Functional transfer training, Endurance training, UE strengthening/ROM, Patient/family training, Compensatory technique education, Activityengagement          See flowsheet documentation for full assessment, interventions and recommendations.   Note: Limitation: Decreased ADL status, Decreased UE ROM, Decreased UE strength, Decreased endurance, Decreased self-care trans, Decreased high-level ADLs  Prognosis: Good  Assessment: Patient is a 68 y.o. female seen for OT evaluation s/p admit to [facility]  on 1/25/2024 w/Hyponatremia. Commorbidities affecting patient's functional performance at time of assessment include: anxiety, chronic migraine, HTN, SIADH, BRBPR, and epistaxis. Orders placed for OT evaluation and treatment and up and OOB as tolerated . Performed at least two patient identifiers during session including name and wristband.  Prior to admission, Patient reporting being independent with ADLs, ambulatory with no AD, and lives with boyfriend. Personal factors affecting patient at time of initial evaluation include: decreased initiation and engagement, difficulty performing ADLs, and difficulty performing IADLs. Upon evaluation, patient requires supervision and minimal  assist for UB ADLs, moderate assist for LB ADLs, transfers and functional ambulation in room and bathroom with minimal  assist of 2, with the use of Rolling Walker.  Patient is oriented x 4.  Occupational performance is affected by the following deficits: limited active ROM, decreased muscle strength, dynamic sit/ stand balance deficit with poor standing tolerance time for self care and functional mobility, decreased activity tolerance, increased pain, and delayed righting and equilibrium reactions.  Patient to benefit from continued Occupational Therapy treatment while in the hospital to address deficits as defined above and maximize level of functional independence with ADLs and functional mobility. Occupational Performance areas to address include: grooming , bathing/ shower, dressing, toilet hygiene, transfer to all surfaces, functional ambulation, medication routine/ management, IADLS: Household maintenance, and IADLs: safety procedures. From OT standpoint, recommendation at time of d/c would be Level II (moderate resource intensity).     Rehab Resource Intensity Level, OT: II (Moderate Resource Intensity)        Beatriz Zimmerman OTRENETTA, OTR/L

## 2024-01-26 NOTE — ASSESSMENT & PLAN NOTE
Lab Results   Component Value Date    SODIUM 120 (L) 01/25/2024    SODIUM 134 (L) 12/28/2023    SODIUM 139 08/21/2023     Background: patient reports feeling general malaise, weakness for about 1.5 weeks. Was treated recently for URI, denies fever, chills, n/v, chest pain  Hx of SIADH on sodium replacement tabs  Appears to be euvolemic on exam  Urine sodium, urine creatinine, urine osmo, and serum osmo ordered  TSH pending  Pending the above values would consider AM cortisol   1.5L Free water restriction   BMP Q8hrs  Consult Nephrology appreciate recommendations for safe, slow correction  Recommend no more than 8-10mmol/day correction

## 2024-01-26 NOTE — OCCUPATIONAL THERAPY NOTE
Occupational Therapy Evaluation      Jazmin Jean    1/26/2024    Patient Active Problem List   Diagnosis    Anxiety    Chronic migraine without aura without status migrainosus, not intractable    Chronic pain    Elevated troponin level not due to acute coronary syndrome    Essential hypertension    Hypokalemia    Hypomagnesemia    Hyponatremia    SIADH (syndrome of inappropriate ADH production) ([facility])    Vitamin D deficiency    BRBPR (bright red blood per rectum)    Epistaxis    Abnormal liver enzymes       History reviewed. No pertinent past medical history.    Past Surgical History:   Procedure Laterality Date    ABDOMINAL SURGERY      EYE SURGERY      JOINT REPLACEMENT          01/26/24 0853   OT Last Visit   OT Visit Date 01/26/24   Note Type   Note type Evaluation   Additional Comments Pt agreeable to OT eval. Upon arrival pt supine in bed with HOB elevated.   Pain Assessment   Pain Assessment Tool 0-10   Pain Score 8   Pain Location/Orientation Location: Head   Pain Onset/Description Onset: Ongoing;Frequency: Constant/Continuous   Hospital Pain Intervention(s) Ambulation/increased activity;Repositioned;Medication (See MAR)   Restrictions/Precautions   Weight Bearing Precautions Per Order No   Braces or Orthoses Other (Comment)  (none reported)   Other Precautions Fall Risk;Pain   Home Living   Type of Home Apartment   Home Layout One level;Performs ADLs on one level;Able to live on main level with bedroom/bathroom;Ramped entrance   Bathroom Shower/Tub Tub/shower unit   Bathroom Toilet Standard   Bathroom Equipment Grab bars in shower;Shower chair   Bathroom Accessibility Accessible   Home Equipment   (no AD used at baseline)   Prior Function   Level of [place] Independent with functional mobility;Independent with ADLs;Needs assistance with IADLS   Lives With Significant other  (boyfriend)   Receives Help From Family   IADLs Family/Friend/Other provides transportation;Family/Friend/Other provides  meals;Independent with medication management   Falls in the last 6 months 1 to 4  (1 fall in Oct 2023)   Vocational Retired   Lifestyle   Autonomy Patient reporting being independent with ADLs, ambulatory with no AD, and lives with boyfriend   Reciprocal Relationships Boyfriend   Service to Others Retired   Intrinsic Gratification TV   ADL   Eating Assistance 6  Modified independent   Grooming Assistance 6  Modified Independent   UB Bathing Assistance 5  Supervision/Setup   LB Bathing Assistance 3  Moderate Assistance   UB Dressing Assistance 4  Minimal Assistance   LB Dressing Assistance 3  Moderate Assistance   Toileting Assistance  3  Moderate Assistance   Additional Comments ADL levels based on functional performance during OT eval.   Bed Mobility   Supine to Sit 4  Minimal assistance   Additional items Assist x 1;HOB elevated;Bedrails;Increased time required;Verbal cues;LE management   Sit to Supine 4  Minimal assistance   Additional items Assist x 1;HOB elevated;Bedrails;Increased time required;Verbal cues;LE management   Transfers   Sit to Stand 4  Minimal assistance   Additional items Assist x 2;Bedrails;Increased time required;Verbal cues   Stand to Sit 4  Minimal assistance   Additional items Assist x 2;Armrests;Increased time required;Verbal cues   Functional Mobility   Functional Mobility 4  Minimal assistance  (Assist of 2)   Additional Comments Pt ambulated around bed with mild signs/symptoms of SOB. SpO2 >90% on RA. Pt shaky and unsteady.   Additional items Rolling walker   Balance   Static Sitting Fair   Dynamic Sitting Fair -   Static Standing Fair -   Dynamic Standing Poor +   Activity Tolerance   Activity Tolerance Patient limited by fatigue   Medical Staff Made Aware Pt seen as a co-eval with PT due to the patient's co-morbidities and clinically unstable presentation indicated by chart review.   RUE Assessment   RUE Assessment X  (~80 degree shoulder flex; impaired d/t hx of shoulder surgery)    LUE Assessment   LUE Assessment X  (~80 degree shoulder flex; impaired d/t hx of shoulder surgery)   Hand Function   Gross Motor Coordination Functional   Fine Motor Coordination Functional   Sensation   Light Touch No apparent deficits   Vision-Basic Assessment   Current Vision Wears glasses all the time   Cognition   Overall Cognitive Status WFL   Arousal/Participation Alert;Responsive;Cooperative   Attention Within functional limits   Orientation Level Oriented X4   Memory Within functional limits   Following Commands Follows all commands and directions without difficulty   Assessment   Limitation Decreased ADL status;Decreased UE ROM;Decreased UE strength;Decreased endurance;Decreased self-care trans;Decreased high-level ADLs   Prognosis Good   Assessment Patient is a 68 y.o. female seen for OT evaluation s/p admit to [facility]  on 1/25/2024 w/Hyponatremia. Commorbidities affecting patient's functional performance at time of assessment include: anxiety, chronic migraine, HTN, SIADH, BRBPR, and epistaxis. Orders placed for OT evaluation and treatment and up and OOB as tolerated . Performed at least two patient identifiers during session including name and wristband.  Prior to admission, Patient reporting being independent with ADLs, ambulatory with no AD, and lives with boyfriend. Personal factors affecting patient at time of initial evaluation include: decreased initiation and engagement, difficulty performing ADLs, and difficulty performing IADLs. Upon evaluation, patient requires supervision and minimal  assist for UB ADLs, moderate assist for LB ADLs, transfers and functional ambulation in room and bathroom with minimal  assist of 2, with the use of Rolling Walker.  Patient is oriented x 4.  Occupational performance is affected by the following deficits: limited active ROM, decreased muscle strength, dynamic sit/ stand balance deficit with poor standing tolerance time for self care and  functional mobility, decreased activity tolerance, increased pain, and delayed righting and equilibrium reactions. Patient to benefit from continued Occupational Therapy treatment while in the hospital to address deficits as defined above and maximize level of functional independence with ADLs and functional mobility. Occupational Performance areas to address include: grooming , bathing/ shower, dressing, toilet hygiene, transfer to all surfaces, functional ambulation, medication routine/ management, IADLS: Household maintenance, and IADLs: safety procedures. From OT standpoint, recommendation at time of d/c would be Level II (moderate resource intensity).   Goals   Patient Goals to get stronger   Plan   Treatment Interventions ADL retraining;Functional transfer training;Endurance training;UE strengthening/ROM;Patient/family training;Compensatory technique education;Activityengagement   Goal Expiration Date 02/10/24   OT Treatment Day 0   OT Frequency 3-5x/wk   Discharge Recommendation   Rehab Resource Intensity Level, OT II (Moderate Resource Intensity)   AM-PAC Daily Activity Inpatient   Lower Body Dressing 2   Bathing 2   Toileting 2   Upper Body Dressing 3   Grooming 4   Eating 4   Daily Activity Raw Score 17   Daily Activity Standardized Score (Calc for Raw Score >=11) 37.26   AM-PAC Applied Cognition Inpatient   Following a Speech/Presentation 4   Understanding Ordinary Conversation 4   Taking Medications 4   Remembering Where Things Are Placed or Put Away 4   Remembering List of 4-5 Errands 4   Taking Care of Complicated Tasks 4   Applied Cognition Raw Score 24   Applied Cognition Standardized Score 62.21   Modified [place] Scale   Modified [place] Scale 4   End of Consult   Patient Position at End of Consult Supine;All needs within reach   Nurse Communication Nurse aware of consult     GOALS:    *ADL transfers with (I) for inc'd independence with ADLs/purposeful tasks    *UB ADL with (I) for inc'd independence  with self cares    *LB ADL with (I) using AE prn for inc'd independence with self cares    *Toileting with (I) for clothing management and hygiene for return to PLOF with personal care    *Increase stand tolerance x 5  m for inc'd tolerance with standing purposeful tasks    *Participate in 10m UE therex to increase overall stamina/activity tolerance for purposeful tasks    *Bed mobility- (I) for inc'd independence to manage own comfort and initiate EOB & OOB purposeful tasks    *Patient will verbalize 3 safety awareness/ principles to prevent falls in the home setting.     *Patient will verbalize and demonstrate use of energy conservation/deep breathing techniques and work simplification skills during functional activities with no verbal cues.     *Patient will increase OOB/sitting tolerance to 2-4 hours per day to increase participation in self-care and leisure tasks with no s/s of exertion.     BOBBY Brown, OTR/L

## 2024-01-26 NOTE — PLAN OF CARE
Problem: PAIN - ADULT  Goal: Verbalizes/displays adequate comfort level or baseline comfort level  Description: Interventions:  - Encourage patient to monitor pain and request assistance  - Assess pain using appropriate pain scale  - Administer analgesics based on type and severity of pain and evaluate response  - Implement non-pharmacological measures as appropriate and evaluate response  - Consider cultural and social influences on pain and pain management  - Notify physician/advanced practitioner if interventions unsuccessful or patient reports new pain  Outcome: Progressing     Problem: INFECTION - ADULT  Goal: Absence or prevention of progression during hospitalization  Description: INTERVENTIONS:  - Assess and monitor for signs and symptoms of infection  - Monitor lab/diagnostic results  - Monitor all insertion sites, i.e. indwelling lines, tubes, and drains  - Monitor endotracheal if appropriate and nasal secretions for changes in amount and color  - [place] appropriate cooling/warming therapies per order  - Administer medications as ordered  - Instruct and encourage patient and family to use good hand hygiene technique  - Identify and instruct in appropriate isolation precautions for identified infection/condition  Outcome: Progressing  Goal: Absence of fever/infection during neutropenic period  Description: INTERVENTIONS:  - Monitor WBC    Outcome: Progressing     Problem: SAFETY ADULT  Goal: Patient will remain free of falls  Description: INTERVENTIONS:  - Educate patient/family on patient safety including physical limitations  - Instruct patient to call for assistance with activity   - Consult OT/PT to assist with strengthening/mobility   - Keep Call bell within reach  - Keep bed low and locked with side rails adjusted as appropriate  - Keep care items and personal belongings within reach  - Initiate and maintain comfort rounds  - Make Fall Risk Sign visible to staff  - Offer Toileting every 2 Hours,  in advance of need  - Initiate/Maintain bed alarm  - Apply yellow socks and bracelet for high fall risk patients  - Consider moving patient to room near nurses station  Outcome: Progressing  Goal: Maintain or return to baseline ADL function  Description: INTERVENTIONS:  -  Assess patient's ability to carry out ADLs; assess patient's baseline for ADL function and identify physical deficits which impact ability to perform ADLs (bathing, care of mouth/teeth, toileting, grooming, dressing, etc.)  - Assess/evaluate cause of self-care deficits   - Assess range of motion  - Assess patient's mobility; develop plan if impaired  - Assess patient's need for assistive devices and provide as appropriate  - Encourage maximum independence but intervene and supervise when necessary  - Involve family in performance of ADLs  - Assess for home care needs following discharge   - Consider OT consult to assist with ADL evaluation and planning for discharge  - Provide patient education as appropriate  Outcome: Progressing  Goal: Maintains/Returns to pre admission functional level  Description: INTERVENTIONS:  - Perform AM-PAC 6 Click Basic Mobility/ Daily Activity assessment daily.  - Set and communicate daily mobility goal to care team and patient/family/caregiver.   - Collaborate with rehabilitation services on mobility goals if consulted  - Perform Range of Motion 3 times a day.  - Reposition patient every 2 hours.  - Dangle patient 3 times a day  - Stand patient 3 times a day  - Ambulate patient 3 times a day  - Out of bed to chair 3 times a day   - Out of bed for meals 3 times a day  - Out of bed for toileting  - Record patient progress and toleration of activity level   Outcome: Progressing

## 2024-01-26 NOTE — PLAN OF CARE
Problem: PAIN - ADULT  Goal: Verbalizes/displays adequate comfort level or baseline comfort level  Description: Interventions:  - Encourage patient to monitor pain and request assistance  - Assess pain using appropriate pain scale  - Administer analgesics based on type and severity of pain and evaluate response  - Implement non-pharmacological measures as appropriate and evaluate response  - Consider cultural and social influences on pain and pain management  - Notify physician/advanced practitioner if interventions unsuccessful or patient reports new pain  Outcome: Progressing     Problem: INFECTION - ADULT  Goal: Absence or prevention of progression during hospitalization  Description: INTERVENTIONS:  - Assess and monitor for signs and symptoms of infection  - Monitor lab/diagnostic results  - Monitor all insertion sites, i.e. indwelling lines, tubes, and drains  - Monitor endotracheal if appropriate and nasal secretions for changes in amount and color  - [place] appropriate cooling/warming therapies per order  - Administer medications as ordered  - Instruct and encourage patient and family to use good hand hygiene technique  - Identify and instruct in appropriate isolation precautions for identified infection/condition  Outcome: Progressing  Goal: Absence of fever/infection during neutropenic period  Description: INTERVENTIONS:  - Monitor WBC    Outcome: Progressing     Problem: SAFETY ADULT  Goal: Patient will remain free of falls  Description: INTERVENTIONS:  - Educate patient/family on patient safety including physical limitations  - Instruct patient to call for assistance with activity   - Consult OT/PT to assist with strengthening/mobility   - Keep Call bell within reach  - Keep bed low and locked with side rails adjusted as appropriate  - Keep care items and personal belongings within reach  - Initiate and maintain comfort rounds  - Make Fall Risk Sign visible to staff  - Offer Toileting every 2 Hours,  in advance of need  - Apply yellow socks and bracelet for high fall risk patients  - Consider moving patient to room near nurses station  Outcome: Progressing  Goal: Maintain or return to baseline ADL function  Description: INTERVENTIONS:  -  Assess patient's ability to carry out ADLs; assess patient's baseline for ADL function and identify physical deficits which impact ability to perform ADLs (bathing, care of mouth/teeth, toileting, grooming, dressing, etc.)  - Assess/evaluate cause of self-care deficits   - Assess range of motion  - Assess patient's mobility; develop plan if impaired  - Assess patient's need for assistive devices and provide as appropriate  - Encourage maximum independence but intervene and supervise when necessary  - Involve family in performance of ADLs  - Assess for home care needs following discharge   - Consider OT consult to assist with ADL evaluation and planning for discharge  - Provide patient education as appropriate  Outcome: Progressing  Goal: Maintains/Returns to pre admission functional level  Description: INTERVENTIONS:  - Perform AM-PAC 6 Click Basic Mobility/ Daily Activity assessment daily.  - Set and communicate daily mobility goal to care team and patient/family/caregiver.   - Collaborate with rehabilitation services on mobility goals if consulted  - Perform Range of Motion 2 times a day.  - Reposition patient every 2 hours.  - Dangle patient 2 times a day  - Stand patient 2 times a day  - Ambulate patient 2 times a day  - Out of bed to chair 2 times a day   - Out of bed for meals 2 times a day  - Out of bed for toileting  - Record patient progress and toleration of activity level   Outcome: Progressing     Problem: DISCHARGE PLANNING  Goal: Discharge to home or other facility with appropriate resources  Description: INTERVENTIONS:  - Identify barriers to discharge w/patient and caregiver  - Arrange for needed discharge resources and transportation as appropriate  - Identify  discharge learning needs (meds, wound care, etc.)  - Arrange for interpretive services to assist at discharge as needed  - Refer to Case Management Department for coordinating discharge planning if the patient needs post-hospital services based on physician/advanced practitioner order or complex needs related to functional status, cognitive ability, or social support system  Outcome: Progressing     Problem: Knowledge Deficit  Goal: Patient/family/caregiver demonstrates understanding of disease process, treatment plan, medications, and discharge instructions  Description: Complete learning assessment and assess knowledge base.  Interventions:  - Provide teaching at level of understanding  - Provide teaching via preferred learning methods  Outcome: Progressing

## 2024-01-26 NOTE — UTILIZATION REVIEW
"Initial Clinical Review    Admission: Date/Time/Statement:   Admission Orders (From admission, onward)       Ordered        01/25/24 2049  INPATIENT ADMISSION  Once                          Orders Placed This Encounter   Procedures    INPATIENT ADMISSION     Standing Status:   Standing     Number of Occurrences:   1     Order Specific Question:   Level of Care     Answer:   Med Surg [16]     Order Specific Question:   Estimated length of stay     Answer:   More than 2 Midnights     Order Specific Question:   Certification     Answer:   I certify that inpatient services are medically necessary for this patient for a duration of greater than two midnights. See H&P and MD Progress Notes for additional information about the patient's course of treatment.     ED Arrival Information       Expected   -    Arrival   1/25/2024 18:13    Acuity   Urgent              Means of arrival   Wheelchair    Escorted by   Family Member    Service   Hospitalist    Admission type   Emergency              Arrival complaint   Rectal Bleeding             Chief Complaint   Patient presents with    Rectal Bleeding     Patient reports nose bleed for 1 week, with some this AM and reports new onset of dark red blood mixed with their stool. Patient reports \"every now and then I get a little blood\" but reports never seeing anyone for it. Patient reports fatigue and sore throat.        Initial Presentation: 68 y.o. female with a PMH of HTN, SIADH, s/p RNY, and anxiety who presents with bright blood per rectum. Patient reports she has been feeling weak and fatigue for about 1.5 weeks. She was recent at outside hospital for URI. She endorse intermittent nose bleeds and 1 day of bright red blood with bowel movement. Upon arrival to ED she was found to have a sodium of 120. CT abdomen negative for acute abdominal process. Plan: Inpatient admission for evaluation and treatment of hyponatremia, bright red blood per rectum, abnormal liver enzymes, " epistaxis, SIADH, HTN, migraine, anxiety: Urine sodium, urine creatinine, urine osmo, and serum osmo, TSH pending, 1500 ml fluid restriction, BMP q 8 hrs, Nephrology consult, GI consult, CIWA protocol, concern for ETOH abuse, CT abd-hepatic steatosis, continue amlodipine and metoprolol, migraine cocktail, citalopram, sertraline.    Date: 1/26   Day 2:     Internal medicine: continue salt tabs and fluid restriction, Nephrology consult, serial BMPs, trend Bili levels, continue metoprolol and amlodipine.     Nephrology consult: Sodium is noted to be 120 mg/L and likely exacerbated by use of alcohol, decreased p.o. intake and recent URI. Workup revealed elevated urine sodium. Urine osmolality and serum osmolality are currently pending. Will initiate patient on high-dose salt tablets 3 g 4 times daily with administration of one-time afternoon. Target sodium of 128 meq/L by a.m. Potassium of 3.2. Will replete.     ED Triage Vitals   Temperature Pulse Respirations Blood Pressure SpO2   01/25/24 1815 01/25/24 1815 01/25/24 1815 01/25/24 1815 01/25/24 1815   98.4 °F (36.9 °C) 104 18 118/67 97 %      Temp Source Heart Rate Source Patient Position - Orthostatic VS BP Location FiO2 (%)   01/25/24 2259 01/25/24 1945 01/25/24 1945 01/25/24 1945 --   Oral Monitor Lying Right arm       Pain Score       01/25/24 2045       5          Wt Readings from Last 1 Encounters:   01/26/24 95.5 kg (210 lb 8.6 oz)     Additional Vital Signs:     Date/Time Temp Pulse Resp BP MAP (mmHg) SpO2 O2 Device   01/26/24 10:50:47 -- 97 18 140/80 100 97 % --   01/26/24 07:41:31 98 °F (36.7 °C) 95 -- 141/79 100 96 % --   01/26/24 0732 -- -- -- -- -- 95 % None (Room air)   01/26/24 0442 -- -- -- -- -- 95 % None (Room air)   01/26/24 03:15:06 97.9 °F (36.6 °C) 105 18 143/79 100 96 % --   01/26/24 01:35:41 -- 109 Abnormal  18 143/79 100 96 % --   01/25/24 2354 -- -- -- -- -- 95 % None (Room air)   01/25/24 22:59:31 98.3 °F (36.8 °C) 116 Abnormal  22 158/62 94  96 % None (Room air)   01/25/24 2200 -- 98 22 135/84 105 98 % None (Room air)   01/25/24 2147 -- 110 Abnormal  -- 139/77 -- -- --   01/25/24 2130 -- 98 20 139/77 101 98 % None (Room air)   01/25/24 2045 -- 100 22 153/69 99 98 % None (Room air)   01/25/24 1945 -- 108 Abnormal  20 -- -- 98 % None (Room air)     Pertinent Labs/Diagnostic Test Results:   CT abdomen pelvis wo contrast   Final Result by Ari Morton MD (01/25 2008)         No evidence of proctitis or perirectal inflammation. No findings to indicate diverticulitis or colitis.      Workstation performed: [id number]           Results from last 7 days   Lab Units 01/25/24  1906   SARS-COV-2  Negative     Results from last 7 days   Lab Units 01/25/24 2206 01/25/24 1906   WBC Thousand/uL  --  3.73*   HEMOGLOBIN g/dL  --  13.1   HEMATOCRIT %  --  36.0   PLATELETS Thousands/uL 110* 122*   NEUTROS ABS Thousands/µL  --  2.39         Results from last 7 days   Lab Units 01/26/24  0835 01/25/24 2206 01/25/24  1906   SODIUM mmol/L 123* 122* 120*   POTASSIUM mmol/L 3.2* 3.5 3.7   CHLORIDE mmol/L 89* 92* 89*   CO2 mmol/L 21 15* 14*   ANION GAP mmol/L 13 15 17   BUN mg/dL 5 9 12   CREATININE mg/dL 0.73 0.78 0.89   EGFR ml/min/1.73sq m 85 78 67   CALCIUM mg/dL 8.2* 7.9* 8.6     Results from last 7 days   Lab Units 01/25/24  1906   AST U/L 133*   ALT U/L 51   ALK PHOS U/L 104   TOTAL PROTEIN g/dL 6.9   ALBUMIN g/dL 4.1   TOTAL BILIRUBIN mg/dL 1.58*         Results from last 7 days   Lab Units 01/26/24  0835 01/25/24 2206 01/25/24  1906   GLUCOSE RANDOM mg/dL 139 105 115     Results from last 7 days   Lab Units 01/25/24  1906   OSMOLALITY, SERUM mmol/         Results from last 7 days   Lab Units 01/26/24  0048 01/25/24 2052 01/25/24 1906   HS TNI 0HR ng/L  --   --  17   HS TNI 2HR ng/L  --  21  --    HSTNI D2 ng/L  --  4  --    HS TNI 4HR ng/L 33  --   --    HSTNI D4 ng/L 16  --   --          Results from last 7 days   Lab Units 01/25/24 1906   PROTIME  seconds 13.8   INR  1.00   PTT seconds 32     Results from last 7 days   Lab Units 01/25/24  2206   TSH 3RD GENERATON uIU/mL 1.995         Results from last 7 days   Lab Units 01/26/24  0048 01/25/24 2052   LACTIC ACID mmol/L 1.6 4.4*             Results from last 7 days   Lab Units 01/25/24  2206   BNP pg/mL 64         Results from last 7 days   Lab Units 01/25/24  1906   LIPASE u/L 38             Results from last 7 days   Lab Units 01/26/24  0537 01/25/24  1906   OSMOLALITY, SERUM mmol/KG  --  288   OSMO UR mmol/  --      Results from last 7 days   Lab Units 01/26/24  0537   SODIUM UR  73   CREATININE UR mg/dL 50.3     Results from last 7 days   Lab Units 01/25/24  1906   INFLUENZA A PCR  Negative   INFLUENZA B PCR  Negative   RSV PCR  Negative             Results from last 7 days   Lab Units 01/25/24  2206   ETHANOL LVL mg/dL 43*         ED Treatment:   Medication Administration from 01/25/2024 1813 to 01/25/2024 2250         Date/Time Order Dose Route Action     01/25/2024 1906 EST sodium chloride 0.9 % bolus 1,000 mL 1,000 mL Intravenous New Bag     01/25/2024 1915 EST acetaminophen (Ofirmev) injection 1,000 mg 1,000 mg Intravenous New Bag     01/25/2024 2111 EST metoclopramide (REGLAN) injection 10 mg 10 mg Intravenous Given          History reviewed. No pertinent past medical history.  Present on Admission:   Hyponatremia   SIADH (syndrome of inappropriate ADH production) ([facility])   Anxiety   Essential hypertension   Chronic migraine without aura without status migrainosus, not intractable   BRBPR (bright red blood per rectum)   Epistaxis   Abnormal liver enzymes      Admitting Diagnosis: SIADH (syndrome of inappropriate ADH production) ([facility]) [E22.2]  Hyponatremia [E87.1]  Rectal bleeding [K62.5]  Age/Sex: 68 y.o. female  Admission Orders:  Scheduled Medications:  amLODIPine, 10 mg, Oral, Daily  docusate sodium, 100 mg, Oral, BID  enoxaparin, 40 mg, Subcutaneous, Daily  folic acid, 1 mg, Oral,  Daily  furosemide, 20 mg, Intravenous, Once  metoprolol succinate, 25 mg, Oral, TID  multivitamin-minerals, 1 tablet, Oral, Daily  pantoprazole, 40 mg, Oral, Daily  sertraline, 100 mg, Oral, HS  sodium chloride, 3 g, Oral, 4x Daily (with meals and at bedtime)  thiamine, 100 mg, Oral, Daily      Continuous IV Infusions:     PRN Meds:  acetaminophen, 650 mg, Oral, Q6H PRN  butalbital-acetaminophen-caffeine, 1 tablet, Oral, Q6H PRN  calcium carbonate, 1,000 mg, Oral, Daily PRN  melatonin, 6 mg, Oral, HS PRN  metoclopramide, 10 mg, Intravenous, Q6H PRN  sodium chloride, 1 spray, Each Nare, Q1H PRN        IP CONSULT TO NEPHROLOGY    Network Utilization Review Department  ATTENTION: Please call with any questions or concerns to [phone] and carefully listen to the prompts so that you are directed to the right person. All voicemails are confidential.   For Discharge needs, contact Care Management DC Support Team at [phone] opt. [phone]  Send all requests for admission clinical reviews, approved or denied determinations and any other requests to dedicated fax number below belonging to the campus where the patient is receiving treatment. List of dedicated fax numbers for the Facilities:  FACILITY NAME UR FAX NUMBER   ADMISSION DENIALS (Administrative/Medical Necessity) [phone]   DISCHARGE SUPPORT TEAM (NETWORK) [phone]   PARENT CHILD HEALTH (Maternity/NICU/Pediatrics) [phone]   [facility] [phone]   [facility] [phone]   [facility] [phone]   [facility] [phone]   [facility] [phone]   [facility] [phone]   [facility] [phone]   [facility] [phone]   [facility] [phone]   [facility]  [phone]   [facility] [phone]   [facility] [phone]

## 2024-01-26 NOTE — ED NOTES
Provider at bedside to update and see pt. Pt to bathroom via W/C, without diff.     Rocío Ho RN  01/25/24 2051

## 2024-01-26 NOTE — CASE MANAGEMENT
Case Management Assessment & Discharge Planning Note    Patient name Jazmin Jean  Location /-01 MRN [id number]  : 1955 Date 2024       Current Admission Date: 2024  Current Admission Diagnosis:Hyponatremia   Patient Active Problem List    Diagnosis Date Noted    SIADH (syndrome of inappropriate ADH production) (HCC) 2024    BRBPR (bright red blood per rectum) 2024    Epistaxis 2024    Abnormal liver enzymes 2024    Hypokalemia 2023    Hypomagnesemia 2023    Vitamin D deficiency 2022    Elevated troponin level not due to acute coronary syndrome 2022    Hyponatremia 2021    Anxiety 01/15/2018    Chronic migraine without aura without status migrainosus, not intractable 01/15/2018    Chronic pain 01/15/2018    Essential hypertension 01/15/2018      LOS (days): 1  Geometric Mean LOS (GMLOS) (days): 3.6  Days to GMLOS:2.8     OBJECTIVE:    Risk of Unplanned Readmission Score: 12.46         Current admission status: Inpatient       Preferred Pharmacy:   [id number] - [address] [address]  Phone: [phone] Fax: [phone]    Primary Care Provider: Deidra Verma NP    Primary Insurance: [employer]  Secondary Insurance:     ASSESSMENT:  Active Health Care Proxies    There are no active Health Care Proxies on file.                 Readmission Root Cause  30 Day Readmission: No    Patient Information  Admitted from:: Home  Mental Status: Alert  During Assessment patient was accompanied by: Not accompanied during assessment  Assessment information provided by:: Patient  Primary Caregiver: Self  Support Systems: Family members, Spouse/significant other  County of Residence: [place]  What city do you live in?: [place]  Home entry access options. Select all that apply.: Ramp, No steps to enter home  Do the steps have railings?: Yes  Type of  Current Residence: Apartment  Floor Level: 1  Upon entering residence, is there a bedroom on the main floor (no further steps)?: Yes  Upon entering residence, is there a bathroom on the main floor (no further steps)?: Yes  Living Arrangements: Lives w/ Spouse/significant other  Is patient a ?: No    Activities of Daily Living Prior to Admission  Functional Status: Independent  Completes ADLs independently?: Yes  Ambulates independently?: Yes  Does patient use assisted devices?: Yes  Assisted Devices (DME) used: Shower Chair (grab bars)  Does patient currently own DME?: Yes  What DME does the patient currently own?: Shower Chair (grab bars)  Does patient have a history of Outpatient Therapy (PT/OT)?: No  Does the patient have a history of Short-Term Rehab?: No  Does patient have a history of HHC?: No  Does patient currently have HHC?: No         Patient Information Continued  Income Source: SSI/SSD  Does patient have prescription coverage?: Yes  Does patient receive dialysis treatments?: No  Does patient have a history of substance abuse?: No  Does patient have a history of Mental Health Diagnosis?: No    PHQ 2/9 Screening   Reviewed PHQ 2/9 Depression Screening Score?: No    Means of Transportation  Means of Transport to Appts:: Drives Self      Housing Stability: Low Risk  (1/26/2024)    Housing Stability Vital Sign     Unable to Pay for Housing in the Last Year: No     Number of Places Lived in the Last Year: 1     Unstable Housing in the Last Year: No   Food Insecurity: No Food Insecurity (1/26/2024)    Hunger Vital Sign     Worried About Running Out of Food in the Last Year: Never true     Ran Out of Food in the Last Year: Never true   Transportation Needs: No Transportation Needs (1/26/2024)    PRAPARE - Transportation     Lack of Transportation (Medical): No     Lack of Transportation (Non-Medical): No   Utilities: Not At Risk (1/26/2024)    [facility] Utilities     Threatened with loss of utilities: No        DISCHARGE DETAILS:    Discharge planning discussed with:: Patient at the bedside  Freedom of Choice: Yes  Comments - Freedom of Choice: FOC maintained in discussion regarding discharge planning. Patient is alert oriented and competent to make decisions. Introduced self and role, explained role of CM in arranging services such as DME, STR, HHC, and providing community resource information. Discussed current living situation and needs at discharge. Patient is IPTA. CM offered HHC, STR, DME, PCP, OP CM, community resources. Patient interested in hhc PT. Does not need additional DME. Pt is aware and encouraged to seek CM for any questions or concerns. CM continues to follow.  CM contacted family/caregiver?: No- see comments (patient will update family)  Were Treatment Team discharge recommendations reviewed with patient/caregiver?: Yes  Did patient/caregiver verbalize understanding of patient care needs?: Yes  Were patient/caregiver advised of the risks associated with not following Treatment Team discharge recommendations?: Yes    Contacts  Patient Contacts: Ralph Turner (Significant Other)  [phone] (Mobile)  Relationship to Patient:: Friend  Reason/Outcome: Continuity of Care, Emergency Contact, Discharge Planning    Requested Home Health Care         Is the patient interested in HHC at discharge?: Yes  Home Health Discipline requested:: Occupational Therapy, Physical Therapy  Home Health Follow-Up Provider:: PCP  Home Health Services Needed:: Strengthening/Theraputic Exercises to Improve Function, Evaluate Functional Status and Safety, Gait/ADL Training  Homebound Criteria Met:: Requires the Assistance of Another Person for Safe Ambulation or to Leave the Home, Uses an Assist Device (i.e. cane, walker, etc)  Supporting Clincal Findings:: Fatigues Easliy in Short Distances, Dyspnea with Exertion, Limited Endurance    DME Referral Provided  Referral made for DME?: No    Other  Referral/Resources/Interventions Provided:  Interventions: HHC  Referral Comments: [place] referral for HHC in [place]. CM will continue to follow for needs.    Would you like to participate in our [facility] service program?  : No - Declined    Treatment Team Recommendation: Home with Home Health Care  Discharge Destination Plan:: Home with Home Health Care  Transport at Discharge : Family

## 2024-01-26 NOTE — H&P
[facility]  H&P  Name: Jazmin Jean 68 y.o. female I MRN: [id number]  Unit/Bed#: ED 24 I Date of Admission: 1/25/2024   Date of Service: 1/25/2024 I Hospital Day: 0      Assessment/Plan   * Hyponatremia  Assessment & Plan  Lab Results   Component Value Date    SODIUM 120 (L) 01/25/2024    SODIUM 134 (L) 12/28/2023    SODIUM 139 08/21/2023     Background: patient reports feeling general malaise, weakness for about 1.5 weeks. Was treated recently for URI, denies fever, chills, n/v, chest pain  Hx of SIADH on sodium replacement tabs  Appears to be euvolemic on exam  Urine sodium, urine creatinine, urine osmo, and serum osmo ordered  TSH pending  Pending the above values would consider AM cortisol   1.5L Free water restriction   BMP Q8hrs  Consult Nephrology appreciate recommendations for safe, slow correction  Recommend no more than 8-10mmol/day correction      BRBPR (bright red blood per rectum)  Assessment & Plan  Reports 1x day for BRBPR, patient denies any other associated symptoms  CT abdomen: No evidence of proctitis or perirectal inflammation. No findings to indicate diverticulitis or colitis   Hgb stable at 13.1,   PT/PTT wnl  GI consult recommendation appreciate    Abnormal liver enzymes  Assessment & Plan  Lab Results   Component Value Date     (H) 01/25/2024    ALKPHOS 104 01/25/2024    ALT 51 01/25/2024    TBILI 1.58 (H) 01/25/2024     CT abdomen: Hepatic steatosis   Concern for ETOH abuse  Ethanol pending  [facility] protocol    Epistaxis  Assessment & Plan  Reports intermittent nose bleeds over the past week  Hgb stable at 13.1  Monitor  PRN Saline nasal spray    SIADH (syndrome of inappropriate ADH production) (HCC)  Assessment & Plan  On NA replacement tabs, reports compliance with medication regime  Patient does not currently follow outpatient nephrology  Plan as above    Essential hypertension  Assessment & Plan  BP acceptable  Continue amlodipine,  "metoprolol  monitor    Chronic migraine without aura without status migrainosus, not intractable  Assessment & Plan  Migraine cocktail given in ED  Continue PRN fioricet    Anxiety  Assessment & Plan  Mood stable  Continue citalopram, sertraline     VTE Pharmacologic Prophylaxis: VTE Score: 3 Moderate Risk (Score 3-4) - Pharmacological DVT Prophylaxis Ordered: enoxaparin (Lovenox).  Code Status: Level 1 - Full Code   Discussion with family: Patient declined call to .     Anticipated Length of Stay: Patient will be admitted on an inpatient basis with an anticipated length of stay of greater than 2 midnights secondary to hyponatremia.    Total Time Spent on Date of Encounter in care of patient: 76 mins. This time was spent on one or more of the following: performing physical exam; counseling and coordination of care; obtaining or reviewing history; documenting in the medical record; reviewing/ordering tests, medications or procedures; communicating with other healthcare professionals and discussing with patient's family/caregivers.    Chief Complaint:    Chief Complaint   Patient presents with    Rectal Bleeding     Patient reports nose bleed for 1 week, with some this AM and reports new onset of dark red blood mixed with their stool. Patient reports \"every now and then I get a little blood\" but reports never seeing anyone for it. Patient reports fatigue and sore throat.          History of Present Illness:  Jazmin Jean is a 68 y.o. female with a PMH of HTN, SIADH, s/p RNY, and anxiety who presents with bright blood per rectum. Patient reports she has been feeling weak and fatigue for about 1.5 weeks. She was recent at outside hospital for URI. She endorse intermittent nose bleeds and 1 day of bright red blood with bowel movement. She denies chest pain, fever, chills, n/v. Upon arrival to ED she was found to have a sodium of 120. CT abdomen negative for acute abdominal process. Will admit for further " evaluation.     Review of Systems:  Review of Systems   Constitutional:  Positive for fatigue.   HENT: Negative.     Respiratory:  Positive for shortness of breath.    Cardiovascular:  Negative for chest pain and palpitations.   Gastrointestinal:  Positive for anal bleeding and blood in stool.   Endocrine: Positive for polydipsia.   Genitourinary: Negative.    Musculoskeletal: Negative.    Skin: Negative.    Neurological:  Positive for weakness.       Past Medical and Surgical History:   No past medical history on file.    No past surgical history on file.    Meds/Allergies:  Prior to Admission medications    Medication Sig Start Date End Date Taking? Authorizing Provider   amLODIPine (NORVASC) 10 mg tablet Take 10 mg by mouth daily 9/12/23  Yes Historical Provider, MD   citalopram (CeleXA) 40 mg tablet Take 40 mg by mouth daily 9/12/23  Yes Historical Provider, MD   metoprolol succinate (TOPROL-XL) 25 mg 24 hr tablet Take 25 mg by mouth Three times a day 1/2/24 4/1/24 Yes Historical Provider, MD   oral electrolytes (THERMOTABS) TABS Take 2 tablets by mouth 2 (two) times a day 11/13/23  Yes Historical Provider, MD   pantoprazole (PROTONIX) 40 mg tablet Take 1 tablet by mouth daily 8/17/23  Yes Historical Provider, MD   butalbital-acetaminophen-caffeine (FIORICET,ESGIC) -40 mg per tablet Take 1 tablet by mouth every 6 (six) hours as needed 1/2/24 1/25/24  Historical Provider, MD   leflunomide (ARAVA) 10 MG tablet Take 10 mg by mouth    Historical Provider, MD   sertraline (ZOLOFT) 100 mg tablet Take 100 mg by mouth    Historical Provider, MD     I have reviewed home medications with patient personally.    Allergies:   Allergies   Allergen Reactions    Iodinated Contrast Media Hives and Palpitations    Prednisone & Diphenhydramine Hives       Social History:  Marital Status: Single   Occupation:   Patient Pre-hospital Living Situation: Home, With spouse  Patient Pre-hospital Level of Mobility: walks  Patient  Pre-hospital Diet Restrictions:   Substance Use History:   Social History     Substance and Sexual Activity   Alcohol Use Yes    Comment: social     Social History     Tobacco Use   Smoking Status Never   Smokeless Tobacco Never     Social History     Substance and Sexual Activity   Drug Use Never       Family History:  No family history on file.    Physical Exam:     Vitals:   Blood Pressure: 139/77 (01/25/24 2147)  Pulse: (!) 110 (01/25/24 2147)  Temperature: 98.4 °F (36.9 °C) (01/25/24 1815)  Respirations: 20 (01/25/24 2130)  Weight - Scale: 95.3 kg (210 lb) (01/25/24 1815)  SpO2: 98 % (01/25/24 2130)    Physical Exam  Constitutional:       General: She is not in acute distress.     Appearance: She is obese.   HENT:      Head: Normocephalic.      Nose: Nose normal.      Mouth/Throat:      Mouth: Mucous membranes are moist.   Eyes:      General: No scleral icterus.  Cardiovascular:      Rate and Rhythm: Normal rate and regular rhythm.      Pulses: Normal pulses.      Heart sounds: Normal heart sounds.   Pulmonary:      Effort: Pulmonary effort is normal. No respiratory distress.      Breath sounds: Normal breath sounds.   Abdominal:      General: There is no distension.      Palpations: Abdomen is soft.   Musculoskeletal:         General: No swelling.   Skin:     General: Skin is warm and dry.      Capillary Refill: Capillary refill takes less than 2 seconds.   Neurological:      General: No focal deficit present.      Mental Status: She is alert and oriented to person, place, and time. Mental status is at baseline.      Motor: Tremor present.   Psychiatric:         Mood and Affect: Mood normal.         Behavior: Behavior normal.         Thought Content: Thought content normal.          Additional Data:     Lab Results:  Results from last 7 days   Lab Units 01/25/24  1906   WBC Thousand/uL 3.73*   HEMOGLOBIN g/dL 13.1   HEMATOCRIT % 36.0   PLATELETS Thousands/uL 122*   NEUTROS PCT % 64   LYMPHS PCT % 26   MONOS  PCT % 9   EOS PCT % 0     Results from last 7 days   Lab Units 01/25/24  1906   SODIUM mmol/L 120*   POTASSIUM mmol/L 3.7   CHLORIDE mmol/L 89*   CO2 mmol/L 14*   BUN mg/dL 12   CREATININE mg/dL 0.89   ANION GAP mmol/L 17   CALCIUM mg/dL 8.6   ALBUMIN g/dL 4.1   TOTAL BILIRUBIN mg/dL 1.58*   ALK PHOS U/L 104   ALT U/L 51   AST U/L 133*   GLUCOSE RANDOM mg/dL 115     Results from last 7 days   Lab Units 01/25/24  1906   INR  1.00             Results from last 7 days   Lab Units 01/25/24  2052   LACTIC ACID mmol/L 4.4*       Lines/Drains:  Invasive Devices       Peripheral Intravenous Line  Duration             Peripheral IV 01/25/24 Left Antecubital <1 day                      ab  Imaging: Reviewed radiology reports from this admission including: abdominal/pelvic CT  CT abdomen pelvis wo contrast   Final Result by Ari Morton MD (01/25 2008)         No evidence of proctitis or perirectal inflammation. No findings to indicate diverticulitis or colitis.      Workstation performed: [id number]             EKG and Other Studies Reviewed on Admission:   EKG: NSR. HR 93.    ** Please Note: This note has been constructed using a voice recognition system. **

## 2024-01-26 NOTE — ASSESSMENT & PLAN NOTE
On NA replacement tabs, reports compliance with medication regime  Follow-up nephrology recommendation

## 2024-01-26 NOTE — ASSESSMENT & PLAN NOTE
Background: patient reports feeling general malaise, weakness for about 1.5 weeks. Was treated recently for URI, denies fever, chills, n/v, chest pain  Does have history of SIADH  Continue with salt tabs and fluid restriction for now  Follow-up nephrology recommendations  Continue with serial BMPs

## 2024-01-27 ENCOUNTER — HOME HEALTH ADMISSION (OUTPATIENT)
Dept: HOME HEALTH SERVICES | Facility: HOME HEALTHCARE | Age: 69
End: 2024-01-27

## 2024-01-27 LAB
ANION GAP SERPL CALCULATED.3IONS-SCNC: 11 MMOL/L
ANION GAP SERPL CALCULATED.3IONS-SCNC: 11 MMOL/L
ANION GAP SERPL CALCULATED.3IONS-SCNC: 12 MMOL/L
ANION GAP SERPL CALCULATED.3IONS-SCNC: 13 MMOL/L
BASOPHILS # BLD AUTO: 0.01 THOUSANDS/ÂΜL (ref 0–0.1)
BASOPHILS NFR BLD AUTO: 0 % (ref 0–1)
BUN SERPL-MCNC: 3 MG/DL (ref 5–25)
BUN SERPL-MCNC: 4 MG/DL (ref 5–25)
CALCIUM SERPL-MCNC: 8.3 MG/DL (ref 8.4–10.2)
CALCIUM SERPL-MCNC: 8.4 MG/DL (ref 8.4–10.2)
CALCIUM SERPL-MCNC: 8.6 MG/DL (ref 8.4–10.2)
CALCIUM SERPL-MCNC: 8.6 MG/DL (ref 8.4–10.2)
CHLORIDE SERPL-SCNC: 91 MMOL/L (ref 96–108)
CHLORIDE SERPL-SCNC: 91 MMOL/L (ref 96–108)
CHLORIDE SERPL-SCNC: 94 MMOL/L (ref 96–108)
CHLORIDE SERPL-SCNC: 96 MMOL/L (ref 96–108)
CO2 SERPL-SCNC: 20 MMOL/L (ref 21–32)
CO2 SERPL-SCNC: 21 MMOL/L (ref 21–32)
CO2 SERPL-SCNC: 23 MMOL/L (ref 21–32)
CO2 SERPL-SCNC: 24 MMOL/L (ref 21–32)
CREAT SERPL-MCNC: 0.66 MG/DL (ref 0.6–1.3)
CREAT SERPL-MCNC: 0.66 MG/DL (ref 0.6–1.3)
CREAT SERPL-MCNC: 0.69 MG/DL (ref 0.6–1.3)
CREAT SERPL-MCNC: 0.71 MG/DL (ref 0.6–1.3)
EOSINOPHIL # BLD AUTO: 0 THOUSAND/ÂΜL (ref 0–0.61)
EOSINOPHIL NFR BLD AUTO: 0 % (ref 0–6)
ERYTHROCYTE [DISTWIDTH] IN BLOOD BY AUTOMATED COUNT: 12.7 % (ref 11.6–15.1)
GFR SERPL CREATININE-BSD FRML MDRD: 88 ML/MIN/1.73SQ M
GFR SERPL CREATININE-BSD FRML MDRD: 90 ML/MIN/1.73SQ M
GFR SERPL CREATININE-BSD FRML MDRD: 91 ML/MIN/1.73SQ M
GFR SERPL CREATININE-BSD FRML MDRD: 91 ML/MIN/1.73SQ M
GLUCOSE SERPL-MCNC: 131 MG/DL (ref 65–140)
GLUCOSE SERPL-MCNC: 132 MG/DL (ref 65–140)
GLUCOSE SERPL-MCNC: 135 MG/DL (ref 65–140)
GLUCOSE SERPL-MCNC: 138 MG/DL (ref 65–140)
HCT VFR BLD AUTO: 36.1 % (ref 34.8–46.1)
HGB BLD-MCNC: 13.5 G/DL (ref 11.5–15.4)
IMM GRANULOCYTES # BLD AUTO: 0.04 THOUSAND/UL (ref 0–0.2)
IMM GRANULOCYTES NFR BLD AUTO: 1 % (ref 0–2)
LYMPHOCYTES # BLD AUTO: 0.88 THOUSANDS/ÂΜL (ref 0.6–4.47)
LYMPHOCYTES NFR BLD AUTO: 19 % (ref 14–44)
MCH RBC QN AUTO: 34.9 PG (ref 26.8–34.3)
MCHC RBC AUTO-ENTMCNC: 37.4 G/DL (ref 31.4–37.4)
MCV RBC AUTO: 93 FL (ref 82–98)
MONOCYTES # BLD AUTO: 0.59 THOUSAND/ÂΜL (ref 0.17–1.22)
MONOCYTES NFR BLD AUTO: 13 % (ref 4–12)
NEUTROPHILS # BLD AUTO: 3.2 THOUSANDS/ÂΜL (ref 1.85–7.62)
NEUTS SEG NFR BLD AUTO: 67 % (ref 43–75)
NRBC BLD AUTO-RTO: 0 /100 WBCS
PLATELET # BLD AUTO: 105 THOUSANDS/UL (ref 149–390)
PMV BLD AUTO: 9.2 FL (ref 8.9–12.7)
POTASSIUM SERPL-SCNC: 2.7 MMOL/L (ref 3.5–5.3)
POTASSIUM SERPL-SCNC: 2.7 MMOL/L (ref 3.5–5.3)
POTASSIUM SERPL-SCNC: 3.2 MMOL/L (ref 3.5–5.3)
POTASSIUM SERPL-SCNC: 3.3 MMOL/L (ref 3.5–5.3)
RBC # BLD AUTO: 3.87 MILLION/UL (ref 3.81–5.12)
SODIUM SERPL-SCNC: 125 MMOL/L (ref 135–147)
SODIUM SERPL-SCNC: 125 MMOL/L (ref 135–147)
SODIUM SERPL-SCNC: 128 MMOL/L (ref 135–147)
SODIUM SERPL-SCNC: 129 MMOL/L (ref 135–147)
WBC # BLD AUTO: 4.72 THOUSAND/UL (ref 4.31–10.16)

## 2024-01-27 PROCEDURE — 99233 SBSQ HOSP IP/OBS HIGH 50: CPT | Performed by: INTERNAL MEDICINE

## 2024-01-27 PROCEDURE — 85025 COMPLETE CBC W/AUTO DIFF WBC: CPT | Performed by: INTERNAL MEDICINE

## 2024-01-27 PROCEDURE — 99232 SBSQ HOSP IP/OBS MODERATE 35: CPT | Performed by: INTERNAL MEDICINE

## 2024-01-27 PROCEDURE — 80048 BASIC METABOLIC PNL TOTAL CA: CPT

## 2024-01-27 PROCEDURE — 80048 BASIC METABOLIC PNL TOTAL CA: CPT | Performed by: INTERNAL MEDICINE

## 2024-01-27 RX ORDER — POTASSIUM CHLORIDE 20 MEQ/1
40 TABLET, EXTENDED RELEASE ORAL ONCE
Status: COMPLETED | OUTPATIENT
Start: 2024-01-27 | End: 2024-01-27

## 2024-01-27 RX ORDER — MAGNESIUM SULFATE HEPTAHYDRATE 40 MG/ML
2 INJECTION, SOLUTION INTRAVENOUS ONCE
Status: COMPLETED | OUTPATIENT
Start: 2024-01-27 | End: 2024-01-27

## 2024-01-27 RX ORDER — FUROSEMIDE 10 MG/ML
20 INJECTION INTRAMUSCULAR; INTRAVENOUS ONCE
Status: COMPLETED | OUTPATIENT
Start: 2024-01-27 | End: 2024-01-27

## 2024-01-27 RX ORDER — POTASSIUM CHLORIDE 14.9 MG/ML
20 INJECTION INTRAVENOUS
Qty: 200 ML | Refills: 0 | Status: COMPLETED | OUTPATIENT
Start: 2024-01-27 | End: 2024-01-28

## 2024-01-27 RX ADMIN — POTASSIUM CHLORIDE 40 MEQ: 1500 TABLET, EXTENDED RELEASE ORAL at 10:30

## 2024-01-27 RX ADMIN — THIAMINE HCL TAB 100 MG 100 MG: 100 TAB at 10:31

## 2024-01-27 RX ADMIN — METOCLOPRAMIDE HYDROCHLORIDE 10 MG: 5 INJECTION INTRAMUSCULAR; INTRAVENOUS at 07:31

## 2024-01-27 RX ADMIN — METOCLOPRAMIDE HYDROCHLORIDE 10 MG: 5 INJECTION INTRAMUSCULAR; INTRAVENOUS at 13:50

## 2024-01-27 RX ADMIN — Medication 3 G: at 13:36

## 2024-01-27 RX ADMIN — METOCLOPRAMIDE HYDROCHLORIDE 10 MG: 5 INJECTION INTRAMUSCULAR; INTRAVENOUS at 19:55

## 2024-01-27 RX ADMIN — Medication 3 G: at 18:00

## 2024-01-27 RX ADMIN — METOPROLOL SUCCINATE 25 MG: 25 TABLET, EXTENDED RELEASE ORAL at 10:30

## 2024-01-27 RX ADMIN — PANTOPRAZOLE SODIUM 40 MG: 40 TABLET, DELAYED RELEASE ORAL at 10:30

## 2024-01-27 RX ADMIN — Medication 1 TABLET: at 10:31

## 2024-01-27 RX ADMIN — DOCUSATE SODIUM 100 MG: 100 CAPSULE, LIQUID FILLED ORAL at 10:30

## 2024-01-27 RX ADMIN — MAGNESIUM SULFATE HEPTAHYDRATE 2 G: 40 INJECTION, SOLUTION INTRAVENOUS at 02:44

## 2024-01-27 RX ADMIN — FOLIC ACID 1 MG: 1 TABLET ORAL at 10:30

## 2024-01-27 RX ADMIN — ACETAMINOPHEN 650 MG: 325 TABLET, FILM COATED ORAL at 10:30

## 2024-01-27 RX ADMIN — METOPROLOL SUCCINATE 25 MG: 25 TABLET, EXTENDED RELEASE ORAL at 21:15

## 2024-01-27 RX ADMIN — POTASSIUM CHLORIDE 20 MEQ: 14.9 INJECTION, SOLUTION INTRAVENOUS at 23:54

## 2024-01-27 RX ADMIN — Medication 3 G: at 07:31

## 2024-01-27 RX ADMIN — ENOXAPARIN SODIUM 40 MG: 40 INJECTION SUBCUTANEOUS at 10:31

## 2024-01-27 RX ADMIN — FUROSEMIDE 20 MG: 10 INJECTION, SOLUTION INTRAMUSCULAR; INTRAVENOUS at 18:00

## 2024-01-27 RX ADMIN — POTASSIUM CHLORIDE 40 MEQ: 1500 TABLET, EXTENDED RELEASE ORAL at 02:37

## 2024-01-27 RX ADMIN — AMLODIPINE BESYLATE 10 MG: 10 TABLET ORAL at 10:31

## 2024-01-27 RX ADMIN — METOPROLOL SUCCINATE 25 MG: 25 TABLET, EXTENDED RELEASE ORAL at 18:00

## 2024-01-27 RX ADMIN — Medication 3 G: at 21:15

## 2024-01-27 RX ADMIN — POTASSIUM CHLORIDE 40 MEQ: 1500 TABLET, EXTENDED RELEASE ORAL at 02:38

## 2024-01-27 RX ADMIN — SERTRALINE 100 MG: 100 TABLET, FILM COATED ORAL at 21:16

## 2024-01-27 NOTE — QUICK NOTE
Patient's K 2.7 overnight. Has been having diarrhea. Ordered infectious stool studies, PO + IV K repletion, and IV mag repletion. AM BMP already in-place.

## 2024-01-27 NOTE — PROGRESS NOTES
NEPHROLOGY PROGRESS NOTE    Patient: Jazmin Jean               Sex: female          DOA: 1/25/2024  6:19 PM   YOB: 1955        Age:  68 y.o.        LOS:  LOS: 2 days   1/27/2024    REASON FOR THE CONSULTATION: Hyponatremia    SUBJECTIVE     Patient was seen and examined at the bedside today.  Reports ongoing poor appetite, eating as tolerated.  Reports she is tolerating salt tablets without nausea.    Reviewed past 24 hour events.    CURRENT MEDICATIONS       Current Facility-Administered Medications:     acetaminophen (TYLENOL) tablet 650 mg, 650 mg, Oral, Q6H PRN, Vy [place]ilma-[place], CRNP, 650 mg at 01/27/24 1030    amLODIPine (NORVASC) tablet 10 mg, 10 mg, Oral, Daily, Vy [place]-Giovanni, CRNP, 10 mg at 01/27/24 1031    calcium carbonate (TUMS) chewable tablet 1,000 mg, 1,000 mg, Oral, Daily PRN, Vy [place], CRNP    docusate sodium (COLACE) capsule 100 mg, 100 mg, Oral, BID, Vy [place], CRNP, 100 mg at 01/27/24 1030    enoxaparin (LOVENOX) subcutaneous injection 40 mg, 40 mg, Subcutaneous, Daily, Vy [place], CRNP, 40 mg at 01/27/24 1031    folic acid (FOLVITE) tablet 1 mg, 1 mg, Oral, Daily, Vy [place]-Giovanni, CRNP, 1 mg at 01/27/24 1030    melatonin tablet 6 mg, 6 mg, Oral, HS PRN, Claudette Glover PA-C, 6 mg at 01/26/24 2236    metoclopramide (REGLAN) injection 10 mg, 10 mg, Intravenous, Q6H PRN, Vy [place], CRNP, 10 mg at 01/27/24 0731    metoprolol succinate (TOPROL-XL) 24 hr tablet 25 mg, 25 mg, Oral, TID, Vy [place], CRNP, 25 mg at 01/27/24 1030    multivitamin-minerals (CENTRUM) tablet 1 tablet, 1 tablet, Oral, Daily, Vy [place], CRNP, 1 tablet at 01/27/24 1031    pantoprazole (PROTONIX) EC tablet 40 mg, 40 mg, Oral, Daily, RANDAL Anders, 40 mg at 01/27/24 1030    sertraline (ZOLOFT) tablet 100 mg, 100 mg, Oral, HS, RANDAL Anders, 100 mg at 01/26/24  2232    sodium chloride (OCEAN) 0.65 % nasal spray 1 spray, 1 spray, Each Nare, Q1H PRN, RANDAL Anders    sodium chloride tablet 3 g, 3 g, Oral, 4x Daily (with meals and at bedtime), Shadi Young MD, 3 g at 01/27/24 1336    thiamine tablet 100 mg, 100 mg, Oral, Daily, RANDAL Anders, 100 mg at 01/27/24 1031    REVIEW OF SYSTEMS     Review of Systems   Constitutional:  Positive for appetite change. Negative for activity change, chills, fatigue and fever.   HENT:  Negative for hearing loss, nosebleeds and trouble swallowing.    Respiratory:  Negative for cough and shortness of breath.    Cardiovascular:  Negative for chest pain and leg swelling.   Gastrointestinal:  Positive for diarrhea. Negative for constipation and vomiting.   Genitourinary:  Negative for difficulty urinating, dysuria, frequency and hematuria.   Musculoskeletal:  Negative for back pain.   Skin:  Negative for pallor.   Neurological:  Negative for dizziness, syncope, weakness and light-headedness.   Psychiatric/Behavioral:  Negative for sleep disturbance. The patient is not nervous/anxious.        OBJECTIVE     Current Weight: Weight - Scale: 92 kg (202 lb 13.2 oz)  Vitals:    01/27/24 1030   BP: 127/78   Pulse: (!) 115   Resp:    Temp:    SpO2:      Body mass index is 34.81 kg/m².    Intake/Output Summary (Last 24 hours) at 1/27/2024 1406  Last data filed at 1/27/2024 1008  Gross per 24 hour   Intake 360 ml   Output 650 ml   Net -290 ml       PHYSICAL EXAMINATION     Physical Exam  Vitals reviewed.   Constitutional:       General: She is not in acute distress.  HENT:      Head: Normocephalic.      Mouth/Throat:      Lips: Pink.      Mouth: Mucous membranes are moist.   Eyes:      General: Lids are normal. No scleral icterus.  Cardiovascular:      Rate and Rhythm: Normal rate and regular rhythm.      Heart sounds: S1 normal and S2 normal.   Pulmonary:      Effort: Pulmonary effort is normal. No accessory muscle usage  or respiratory distress.      Breath sounds: Normal breath sounds.   Abdominal:      General: There is no distension.      Tenderness: There is no abdominal tenderness.   Musculoskeletal:      Cervical back: Normal range of motion and neck supple. No tenderness.   Skin:     General: Skin is warm.      Coloration: Skin is not cyanotic or jaundiced.   Neurological:      General: No focal deficit present.      Mental Status: She is alert and oriented to person, place, and time.   Psychiatric:         Attention and Perception: Attention normal.         Speech: Speech normal.         Behavior: Behavior is cooperative.           LAB RESULTS     Results from last 7 days   Lab Units 01/27/24  0522 01/27/24  0005 01/26/24  1558 01/26/24  0835 01/25/24  2206 01/25/24  1906   WBC Thousand/uL 4.72  --   --   --   --  3.73*   HEMOGLOBIN g/dL 13.5  --   --   --   --  13.1   HEMATOCRIT % 36.1  --   --   --   --  36.0   PLATELETS Thousands/uL 105*  --   --   --  110* 122*   SODIUM mmol/L 125* 125* 123* 123* 122* 120*   POTASSIUM mmol/L 3.3* 2.7* 2.8* 3.2* 3.5 3.7   CHLORIDE mmol/L 91* 91* 89* 89* 92* 89*   CO2 mmol/L 23 21 21 21 15* 14*   BUN mg/dL 3* 4* 4* 5 9 12   CREATININE mg/dL 0.66 0.66 0.70 0.73 0.78 0.89   EGFR ml/min/1.73sq m 91 91 89 85 78 67   CALCIUM mg/dL 8.6 8.3* 8.6 8.2* 7.9* 8.6       RADIOLOGY RESULTS      CT of the abdomen and pelvis without contrast 1/25/2024:  IMPRESSION:        No evidence of proctitis or perirectal inflammation. No findings to indicate diverticulitis or colitis.    ASSESSMENT/PLAN     68-year-old female with a past medical history significant for hypertension, hyponatremia secondary to underlying SIADH, obesity, and alcohol use who presents to our facility with epistaxis, dizziness, and tremors and found to have hyponatremia.    Hyponatremia:  Patient chronically on oral salt tablets 2 g twice daily at home.  Workup revealing for underlying SIADH of unclear etiology.  Presented with a sodium  "level of 120 and low, suspect hyponatremia exacerbation in setting of alcohol use.  Workup revealing for urine osmolality of 423 and urine sodium 73, indicative of underlying SIADH etiology.    He is currently maintained on oral salt tablets 3 g 4 times daily and received a dose of IV furosemide yesterday.  Patient reports she is tolerating salt tablets without nausea, sodium level slowly improving to 125 on labs this morning.  Patient with new onset of diarrhea over the past 24 hours which may be contributing to ongoing sodium losses.    She remains asymptomatic from a hyponatremia standpoint.  Will continue oral salt tablets. Will continue to monitor BMP every 8 hour, if no continued improvement will consider initiation of 1.8% saline.    Hypokalemia:  Potassium low at 3.3 this morning, repletion ordered.    Hypertension:  Currently maintained on amlodipine 10 mg daily and metoprolol 25 mg 3 times daily.  Pressure readings acceptable over the past 24 hours, most recently 127/78.    RANDAL Boland  Nephrology  1/27/2024      Portions of the record may have been created with voice recognition software. Occasional wrong word or \"sound a like\" substitutions may have occurred due to the inherent limitations of voice recognition software. Read the chart carefully and recognize, using context, where substitutions have occurred.   "

## 2024-01-27 NOTE — UTILIZATION REVIEW
NOTIFICATION OF INPATIENT ADMISSION   AUTHORIZATION REQUEST   SERVICING FACILITY:   [place] [address]  Tax ID: [id number]  NPI: [id number] ATTENDING PROVIDER:  Attending Name and NPI#: Lee Thurman Md [[id number]]  Address: [address] [place] [address]  Phone: [phone]     ADMISSION INFORMATION:  Place of Service: Inpatient [facility]  Place of Service Code: 21  Inpatient Admission Date/Time: 1/25/24  8:49 PM  Discharge Date/Time: No discharge date for patient encounter.  Admitting Diagnosis Code/Description:  SIADH (syndrome of inappropriate ADH production) ([facility]) [E22.2]  Hyponatremia [E87.1]  Rectal bleeding [K62.5]     UTILIZATION REVIEW CONTACT:  Yessi Carlisle, Utilization   Network Utilization Review Department  Phone: [phone]  Fax [phone]  Email: [email or web address]  Contact for approvals/pending authorizations, clinical reviews, and discharge.     PHYSICIAN ADVISORY SERVICES:  Medical Necessity Denial & Grqj-bm-Cpcj Review  Phone: [phone]  Fax: [phone]  Email: [email or web address]     DISCHARGE SUPPORT TEAM:  For Patients Discharge Needs & Updates  Phone: [phone] opt. 2 Fax: [phone]  Email: [email or web address]

## 2024-01-27 NOTE — UTILIZATION REVIEW
Date: 1/27    Day 3: Has surpassed a 2nd midnight with active treatments and services, which include ongoing workup and treat for hyponatremia, Abnormal LFTs.

## 2024-01-27 NOTE — ASSESSMENT & PLAN NOTE
Suspect secondary to SIADH  Sodium mildly improved  Continue salt tabs  Continue water restriction  Follow-up further nephrology recommendations

## 2024-01-27 NOTE — PROGRESS NOTES
[facility]  Progress Note  Name: Jazmin Jean I  MRN: [id number]  Unit/Bed#: -01 I Date of Admission: 2024   Date of Service: 2024 I Hospital Day: 2    Assessment/Plan   * Hyponatremia  Assessment & Plan  Suspect secondary to SIADH  Sodium mildly improved  Continue salt tabs  Continue water restriction  Follow-up further nephrology recommendations            BRBPR (bright red blood per rectum)  Assessment & Plan  Resolved  Hemoglobin remained stable  Possibly secondary to hemorrhoids    SIADH (syndrome of inappropriate ADH production) ([facility])  Assessment & Plan  Continue with scheduled salt tabs  Continue with salt tabs 3 g 4 times daily    Essential hypertension  Assessment & Plan  BP remains controlled  Continue amlodipine and metoprolol     Chronic migraine without aura without status migrainosus, not intractable  Assessment & Plan  Continue as needed Fioricet    Anxiety  Assessment & Plan  Continue SSRI           VTE Pharmacologic Prophylaxis:   Pharmacologic: Enoxaparin (Lovenox)  Mechanical VTE Prophylaxis in Place: Yes    Patient Centered Rounds: I have performed bedside rounds with nursing staff today.    Discussions with Specialists or Other Care Team Provider: cm, nursing    Education and Discussions with Family / Patient: pt, declined family update    Time Spent for Care: 30 minutes.  More than 50% of total time spent on counseling and coordination of care as described above.    Current Length of Stay: 2 day(s)    Current Patient Status: Inpatient   Certification Statement: The patient will continue to require additional inpatient hospital stay due to see below    Discharge Plan: Pending further improvement of hyponatremia.  Anticipate discharge in 24 to 48 hours    Code Status: Level 1 - Full Code      Subjective:   Currently without any acute complaints.  Denies nausea, vomiting, abdominal pain, fevers, chills    Objective:     Vitals:   Temp (24hrs), Av.5 °F  (36.9 °C), Min:98.1 °F (36.7 °C), Max:98.8 °F (37.1 °C)    Temp:  [98.1 °F (36.7 °C)-98.8 °F (37.1 °C)] 98.8 °F (37.1 °C)  HR:  [] 98  Resp:  [16-18] 16  BP: (127-146)/(77-84) 127/77  SpO2:  [95 %-97 %] 96 %  Body mass index is 34.81 kg/m².     Input and Output Summary (last 24 hours):       Intake/Output Summary (Last 24 hours) at 1/27/2024 1029  Last data filed at 1/27/2024 1008  Gross per 24 hour   Intake 780 ml   Output 650 ml   Net 130 ml       Physical Exam:     Physical Exam  Constitutional:       General: She is not in acute distress.     Appearance: She is well-developed. She is not diaphoretic.   HENT:      Head: Normocephalic and atraumatic.      Nose: Nose normal.      Mouth/Throat:      Pharynx: No oropharyngeal exudate.   Eyes:      General: No scleral icterus.        Right eye: No discharge.         Left eye: No discharge.      Conjunctiva/sclera: Conjunctivae normal.   Neck:      Thyroid: No thyromegaly.      Vascular: No JVD.   Cardiovascular:      Rate and Rhythm: Normal rate and regular rhythm.      Heart sounds: Normal heart sounds. No murmur heard.     No friction rub. No gallop.   Pulmonary:      Effort: Pulmonary effort is normal. No respiratory distress.      Breath sounds: Normal breath sounds. No wheezing or rales.   Chest:      Chest wall: No tenderness.   Abdominal:      General: Bowel sounds are normal. There is no distension.      Palpations: Abdomen is soft.      Tenderness: There is no abdominal tenderness. There is no guarding or rebound.   Musculoskeletal:         General: No tenderness or deformity. Normal range of motion.      Cervical back: Normal range of motion and neck supple.   Skin:     General: Skin is warm and dry.      Findings: No erythema or rash.   Neurological:      Mental Status: She is alert. Mental status is at baseline.      Cranial Nerves: No cranial nerve deficit.      Sensory: No sensory deficit.      Motor: No abnormal muscle tone.      Coordination:  Coordination normal.       (     Additional Data:     Labs:    Results from last 7 days   Lab Units 01/27/24  0522   WBC Thousand/uL 4.72   HEMOGLOBIN g/dL 13.5   HEMATOCRIT % 36.1   PLATELETS Thousands/uL 105*   NEUTROS PCT % 67   LYMPHS PCT % 19   MONOS PCT % 13*   EOS PCT % 0     Results from last 7 days   Lab Units 01/27/24  0522 01/25/24  2206 01/25/24  1906   SODIUM mmol/L 125*   < > 120*   POTASSIUM mmol/L 3.3*   < > 3.7   CHLORIDE mmol/L 91*   < > 89*   CO2 mmol/L 23   < > 14*   BUN mg/dL 3*   < > 12   CREATININE mg/dL 0.66   < > 0.89   ANION GAP mmol/L 11   < > 17   CALCIUM mg/dL 8.6   < > 8.6   ALBUMIN g/dL  --   --  4.1   TOTAL BILIRUBIN mg/dL  --   --  1.58*   ALK PHOS U/L  --   --  104   ALT U/L  --   --  51   AST U/L  --   --  133*   GLUCOSE RANDOM mg/dL 132   < > 115    < > = values in this interval not displayed.     Results from last 7 days   Lab Units 01/25/24  1906   INR  1.00             Results from last 7 days   Lab Units 01/26/24  0048 01/25/24  2052   LACTIC ACID mmol/L 1.6 4.4*           * I Have Reviewed All Lab Data Listed Above.  * Additional Pertinent Lab Tests Reviewed: All Labs Within Last 24 Hours Reviewed    Imaging:    Imaging Reports Reviewed Today Include: na  Imaging Personally Reviewed by Myself Includes:  na    Recent Cultures (last 7 days):           Last 24 Hours Medication List:   Current Facility-Administered Medications   Medication Dose Route Frequency Provider Last Rate    acetaminophen  650 mg Oral Q6H PRN Vy [place]-Giovanni, CRNP      amLODIPine  10 mg Oral Daily Vy Edilma-Giovanni, CRNP      calcium carbonate  1,000 mg Oral Daily PRN Vy [place]-Addy, CRNP      docusate sodium  100 mg Oral BID Vy [place]-Giovanni, CRNP      enoxaparin  40 mg Subcutaneous Daily Vy [place]-Addy, CRNP      folic acid  1 mg Oral Daily Vy [place]-Addy, CRNP      melatonin  6 mg Oral HS PRN Claudette A Glover, PA-C      metoclopramide  10 mg  Intravenous Q6H PRN Vy Daily, RANDAL      metoprolol succinate  25 mg Oral TID RANDAL Anders      multivitamin-minerals  1 tablet Oral Daily Vy Daily, RANDAL      pantoprazole  40 mg Oral Daily Vy Daily, RANDAL      potassium chloride  40 mEq Oral Once Justina Cali, RANDAL      sertraline  100 mg Oral HS RANDAL Anders      sodium chloride  1 spray Each Nare Q1H PRN RANDAL Anders      sodium chloride  3 g Oral 4x Daily (with meals and at bedtime) Shadi Young MD      thiamine  100 mg Oral Daily RANDAL Anders          Today, Patient Was Seen By: Lee Thurman MD    ** Please Note: Dictation voice to text software may have been used in the creation of this document. **

## 2024-01-28 PROBLEM — R19.7 DIARRHEA: Status: ACTIVE | Noted: 2024-01-28

## 2024-01-28 LAB
ALBUMIN SERPL BCP-MCNC: 4.1 G/DL (ref 3.5–5)
ALP SERPL-CCNC: 103 U/L (ref 34–104)
ALT SERPL W P-5'-P-CCNC: 62 U/L (ref 7–52)
ANION GAP SERPL CALCULATED.3IONS-SCNC: 12 MMOL/L
ANION GAP SERPL CALCULATED.3IONS-SCNC: 13 MMOL/L
ANION GAP SERPL CALCULATED.3IONS-SCNC: 9 MMOL/L
AST SERPL W P-5'-P-CCNC: 97 U/L (ref 13–39)
BASOPHILS # BLD AUTO: 0.02 THOUSANDS/ÂΜL (ref 0–0.1)
BASOPHILS NFR BLD AUTO: 0 % (ref 0–1)
BILIRUB DIRECT SERPL-MCNC: 0.61 MG/DL (ref 0–0.2)
BILIRUB SERPL-MCNC: 1.49 MG/DL (ref 0.2–1)
BUN SERPL-MCNC: 4 MG/DL (ref 5–25)
C COLI+JEJUNI TUF STL QL NAA+PROBE: NEGATIVE
C DIFF TOX B TCDB STL QL NAA+PROBE: NEGATIVE
CALCIUM SERPL-MCNC: 8.6 MG/DL (ref 8.4–10.2)
CALCIUM SERPL-MCNC: 8.6 MG/DL (ref 8.4–10.2)
CALCIUM SERPL-MCNC: 8.7 MG/DL (ref 8.4–10.2)
CHLORIDE SERPL-SCNC: 94 MMOL/L (ref 96–108)
CHLORIDE SERPL-SCNC: 94 MMOL/L (ref 96–108)
CHLORIDE SERPL-SCNC: 95 MMOL/L (ref 96–108)
CO2 SERPL-SCNC: 22 MMOL/L (ref 21–32)
CO2 SERPL-SCNC: 22 MMOL/L (ref 21–32)
CO2 SERPL-SCNC: 25 MMOL/L (ref 21–32)
CREAT SERPL-MCNC: 0.63 MG/DL (ref 0.6–1.3)
CREAT SERPL-MCNC: 0.68 MG/DL (ref 0.6–1.3)
CREAT SERPL-MCNC: 0.71 MG/DL (ref 0.6–1.3)
EC STX1+STX2 GENES STL QL NAA+PROBE: NEGATIVE
EOSINOPHIL # BLD AUTO: 0.01 THOUSAND/ÂΜL (ref 0–0.61)
EOSINOPHIL NFR BLD AUTO: 0 % (ref 0–6)
ERYTHROCYTE [DISTWIDTH] IN BLOOD BY AUTOMATED COUNT: 13.2 % (ref 11.6–15.1)
GFR SERPL CREATININE-BSD FRML MDRD: 88 ML/MIN/1.73SQ M
GFR SERPL CREATININE-BSD FRML MDRD: 90 ML/MIN/1.73SQ M
GFR SERPL CREATININE-BSD FRML MDRD: 93 ML/MIN/1.73SQ M
GLUCOSE SERPL-MCNC: 120 MG/DL (ref 65–140)
GLUCOSE SERPL-MCNC: 125 MG/DL (ref 65–140)
GLUCOSE SERPL-MCNC: 155 MG/DL (ref 65–140)
HCT VFR BLD AUTO: 37 % (ref 34.8–46.1)
HGB BLD-MCNC: 13.3 G/DL (ref 11.5–15.4)
IMM GRANULOCYTES # BLD AUTO: 0.03 THOUSAND/UL (ref 0–0.2)
IMM GRANULOCYTES NFR BLD AUTO: 1 % (ref 0–2)
LYMPHOCYTES # BLD AUTO: 1.48 THOUSANDS/ÂΜL (ref 0.6–4.47)
LYMPHOCYTES NFR BLD AUTO: 29 % (ref 14–44)
MCH RBC QN AUTO: 34.5 PG (ref 26.8–34.3)
MCHC RBC AUTO-ENTMCNC: 35.9 G/DL (ref 31.4–37.4)
MCV RBC AUTO: 96 FL (ref 82–98)
MONOCYTES # BLD AUTO: 0.68 THOUSAND/ÂΜL (ref 0.17–1.22)
MONOCYTES NFR BLD AUTO: 13 % (ref 4–12)
NEUTROPHILS # BLD AUTO: 2.85 THOUSANDS/ÂΜL (ref 1.85–7.62)
NEUTS SEG NFR BLD AUTO: 57 % (ref 43–75)
NRBC BLD AUTO-RTO: 0 /100 WBCS
PLATELET # BLD AUTO: 106 THOUSANDS/UL (ref 149–390)
PMV BLD AUTO: 9.5 FL (ref 8.9–12.7)
POTASSIUM SERPL-SCNC: 3.1 MMOL/L (ref 3.5–5.3)
POTASSIUM SERPL-SCNC: 3.1 MMOL/L (ref 3.5–5.3)
POTASSIUM SERPL-SCNC: 3.5 MMOL/L (ref 3.5–5.3)
PROT SERPL-MCNC: 7 G/DL (ref 6.4–8.4)
RBC # BLD AUTO: 3.85 MILLION/UL (ref 3.81–5.12)
SALMONELLA SP SPAO STL QL NAA+PROBE: NEGATIVE
SHIGELLA SP+EIEC IPAH STL QL NAA+PROBE: NEGATIVE
SODIUM SERPL-SCNC: 128 MMOL/L (ref 135–147)
SODIUM SERPL-SCNC: 129 MMOL/L (ref 135–147)
SODIUM SERPL-SCNC: 129 MMOL/L (ref 135–147)
WBC # BLD AUTO: 5.07 THOUSAND/UL (ref 4.31–10.16)

## 2024-01-28 PROCEDURE — 99233 SBSQ HOSP IP/OBS HIGH 50: CPT | Performed by: INTERNAL MEDICINE

## 2024-01-28 PROCEDURE — 80048 BASIC METABOLIC PNL TOTAL CA: CPT | Performed by: INTERNAL MEDICINE

## 2024-01-28 PROCEDURE — 99232 SBSQ HOSP IP/OBS MODERATE 35: CPT | Performed by: INTERNAL MEDICINE

## 2024-01-28 PROCEDURE — 80076 HEPATIC FUNCTION PANEL: CPT | Performed by: INTERNAL MEDICINE

## 2024-01-28 PROCEDURE — 85025 COMPLETE CBC W/AUTO DIFF WBC: CPT | Performed by: INTERNAL MEDICINE

## 2024-01-28 RX ORDER — POTASSIUM CHLORIDE 14.9 MG/ML
20 INJECTION INTRAVENOUS
Status: COMPLETED | OUTPATIENT
Start: 2024-01-28 | End: 2024-01-29

## 2024-01-28 RX ADMIN — METOCLOPRAMIDE HYDROCHLORIDE 10 MG: 5 INJECTION INTRAMUSCULAR; INTRAVENOUS at 15:28

## 2024-01-28 RX ADMIN — POTASSIUM CHLORIDE 20 MEQ: 14.9 INJECTION, SOLUTION INTRAVENOUS at 22:39

## 2024-01-28 RX ADMIN — SERTRALINE 100 MG: 100 TABLET, FILM COATED ORAL at 22:39

## 2024-01-28 RX ADMIN — METOPROLOL SUCCINATE 25 MG: 25 TABLET, EXTENDED RELEASE ORAL at 09:16

## 2024-01-28 RX ADMIN — METOCLOPRAMIDE HYDROCHLORIDE 10 MG: 5 INJECTION INTRAMUSCULAR; INTRAVENOUS at 23:13

## 2024-01-28 RX ADMIN — Medication 1 TABLET: at 09:16

## 2024-01-28 RX ADMIN — PANTOPRAZOLE SODIUM 40 MG: 40 TABLET, DELAYED RELEASE ORAL at 09:16

## 2024-01-28 RX ADMIN — POTASSIUM CHLORIDE 20 MEQ: 14.9 INJECTION, SOLUTION INTRAVENOUS at 20:15

## 2024-01-28 RX ADMIN — Medication 3 G: at 15:31

## 2024-01-28 RX ADMIN — METOPROLOL SUCCINATE 25 MG: 25 TABLET, EXTENDED RELEASE ORAL at 15:28

## 2024-01-28 RX ADMIN — POTASSIUM CHLORIDE 20 MEQ: 14.9 INJECTION, SOLUTION INTRAVENOUS at 02:52

## 2024-01-28 RX ADMIN — METOCLOPRAMIDE HYDROCHLORIDE 10 MG: 5 INJECTION INTRAMUSCULAR; INTRAVENOUS at 03:07

## 2024-01-28 RX ADMIN — Medication 3 G: at 22:39

## 2024-01-28 RX ADMIN — Medication 3 G: at 12:32

## 2024-01-28 RX ADMIN — AMLODIPINE BESYLATE 10 MG: 10 TABLET ORAL at 09:16

## 2024-01-28 RX ADMIN — ENOXAPARIN SODIUM 40 MG: 40 INJECTION SUBCUTANEOUS at 09:17

## 2024-01-28 RX ADMIN — FOLIC ACID 1 MG: 1 TABLET ORAL at 09:16

## 2024-01-28 RX ADMIN — THIAMINE HCL TAB 100 MG 100 MG: 100 TAB at 09:16

## 2024-01-28 RX ADMIN — METOPROLOL SUCCINATE 25 MG: 25 TABLET, EXTENDED RELEASE ORAL at 22:39

## 2024-01-28 RX ADMIN — METOCLOPRAMIDE HYDROCHLORIDE 10 MG: 5 INJECTION INTRAMUSCULAR; INTRAVENOUS at 09:17

## 2024-01-28 RX ADMIN — Medication 3 G: at 09:16

## 2024-01-28 NOTE — ASSESSMENT & PLAN NOTE
Reports diarrhea for the past 4 days  Suspect likely viral in etiology  C. difficile currently in process  WBC count within normal limits

## 2024-01-28 NOTE — ASSESSMENT & PLAN NOTE
Suspect secondary to SIADH  Slowly improving with water restriction and salt tabs  Sodium now 128  Will continue with salt tabs  Follow-up further nephrology recommendations

## 2024-01-28 NOTE — PROGRESS NOTES
Pending sale to [facility]  Progress Note  Name: Jazmin Jean I  MRN: [id number]  Unit/Bed#: -Anjana I Date of Admission: 2024   Date of Service: 2024 I Hospital Day: 3    Assessment/Plan   * Hyponatremia  Assessment & Plan  Suspect secondary to SIADH  Slowly improving with water restriction and salt tabs  Sodium now 128  Will continue with salt tabs  Follow-up further nephrology recommendations          Diarrhea  Assessment & Plan  Reports diarrhea for the past 4 days  Suspect likely viral in etiology  C. difficile currently in process  WBC count within normal limits    BRBPR (bright red blood per rectum)  Assessment & Plan  Resolved  Hemoglobin is stabilized    SIADH (syndrome of inappropriate ADH production) ([facility])  Assessment & Plan   continue salt tabs 3 g 4 times daily    Essential hypertension  Assessment & Plan  Blood pressure remains controlled  Continue amlodipine and metoprolol    Anxiety  Assessment & Plan  Continue SSRI           VTE Pharmacologic Prophylaxis:   Pharmacologic: Enoxaparin (Lovenox)  Mechanical VTE Prophylaxis in Place: Yes    Patient Centered Rounds: I have performed bedside rounds with nursing staff today.    Discussions with Specialists or Other Care Team Provider: cm, nursing    Education and Discussions with Family / Patient: pt, family    Time Spent for Care: 30 minutes.  More than 50% of total time spent on counseling and coordination of care as described above.    Current Length of Stay: 3 day(s)    Current Patient Status: Inpatient   Certification Statement: ThisThe patient will continue to require additional inpatient hospital stay due to see below    Discharge Plan: Anticipate discharge in next 24 hours pending improvement of sodium  Code Status: Level 1 - Full Code      Subjective:   Still reports diarrhea similar as yesterday.  Denies any nausea, vomiting, fevers, chills    Objective:     Vitals:   Temp (24hrs), Av.2 °F (36.8 °C), Min:97.9 °F (36.6  °C), Max:98.8 °F (37.1 °C)    Temp:  [97.9 °F (36.6 °C)-98.8 °F (37.1 °C)] 98.2 °F (36.8 °C)  HR:  [] 98  BP: (130-147)/(83-93) 142/90  SpO2:  [95 %-96 %] 96 %  Body mass index is 34.66 kg/m².     Input and Output Summary (last 24 hours):       Intake/Output Summary (Last 24 hours) at 1/28/2024 1053  Last data filed at 1/27/2024 1510  Gross per 24 hour   Intake 480 ml   Output 150 ml   Net 330 ml       Physical Exam:     Physical Exam  Constitutional:       General: She is not in acute distress.     Appearance: She is well-developed. She is not diaphoretic.   HENT:      Head: Normocephalic and atraumatic.      Nose: Nose normal.      Mouth/Throat:      Pharynx: No oropharyngeal exudate.   Eyes:      General: No scleral icterus.        Right eye: No discharge.         Left eye: No discharge.      Conjunctiva/sclera: Conjunctivae normal.   Neck:      Thyroid: No thyromegaly.      Vascular: No JVD.   Cardiovascular:      Rate and Rhythm: Normal rate and regular rhythm.      Heart sounds: Normal heart sounds. No murmur heard.     No friction rub. No gallop.   Pulmonary:      Effort: Pulmonary effort is normal. No respiratory distress.      Breath sounds: Normal breath sounds. No wheezing or rales.   Chest:      Chest wall: No tenderness.   Abdominal:      General: Bowel sounds are normal. There is no distension.      Palpations: Abdomen is soft.      Tenderness: There is no abdominal tenderness. There is no guarding or rebound.   Musculoskeletal:         General: No tenderness or deformity. Normal range of motion.      Cervical back: Normal range of motion and neck supple.   Skin:     General: Skin is warm and dry.      Findings: No erythema or rash.   Neurological:      Mental Status: She is alert. Mental status is at baseline.      Cranial Nerves: No cranial nerve deficit.      Sensory: No sensory deficit.      Motor: No abnormal muscle tone.      Coordination: Coordination normal.           Additional Data:      Labs:    Results from last 7 days   Lab Units 01/28/24  0523   WBC Thousand/uL 5.07   HEMOGLOBIN g/dL 13.3   HEMATOCRIT % 37.0   PLATELETS Thousands/uL 106*   NEUTROS PCT % 57   LYMPHS PCT % 29   MONOS PCT % 13*   EOS PCT % 0     Results from last 7 days   Lab Units 01/28/24  0523   SODIUM mmol/L 128*   POTASSIUM mmol/L 3.5   CHLORIDE mmol/L 94*   CO2 mmol/L 22   BUN mg/dL 4*   CREATININE mg/dL 0.71   ANION GAP mmol/L 12   CALCIUM mg/dL 8.6   ALBUMIN g/dL 4.1   TOTAL BILIRUBIN mg/dL 1.49*   ALK PHOS U/L 103   ALT U/L 62*   AST U/L 97*   GLUCOSE RANDOM mg/dL 120     Results from last 7 days   Lab Units 01/25/24  1906   INR  1.00             Results from last 7 days   Lab Units 01/26/24  0048 01/25/24  2052   LACTIC ACID mmol/L 1.6 4.4*           * I Have Reviewed All Lab Data Listed Above.  * Additional Pertinent Lab Tests Reviewed: All Labs Within Last 24 Hours Reviewed    Imaging:    Imaging Reports Reviewed Today Include: na  Imaging Personally Reviewed by Myself Includes:  na    Recent Cultures (last 7 days):     Results from last 7 days   Lab Units 01/26/24 2044   C DIFF TOXIN B BY PCR  Negative       Last 24 Hours Medication List:   Current Facility-Administered Medications   Medication Dose Route Frequency Provider Last Rate    acetaminophen  650 mg Oral Q6H PRN Vy Daily, CRNP      amLODIPine  10 mg Oral Daily Vy Daily, CRNP      calcium carbonate  1,000 mg Oral Daily PRN Vy Daily, CRNP      docusate sodium  100 mg Oral BID Vy Daily, CRNP      enoxaparin  40 mg Subcutaneous Daily Vy Daily, CRNP      folic acid  1 mg Oral Daily Vy Daily, CRNP      melatonin  6 mg Oral HS PRN Claudette Glover PA-C      metoclopramide  10 mg Intravenous Q6H PRN Vy Daily, CRNP      metoprolol succinate  25 mg Oral TID Vy Daily, CRNP      multivitamin-minerals  1 tablet Oral Daily Vy  RANDAL Daily      pantoprazole  40 mg Oral Daily RANDAL Anders      sertraline  100 mg Oral HS RANDAL Anders      sodium chloride  1 spray Each Nare Q1H PRN RANDAL Anders      sodium chloride  3 g Oral 4x Daily (with meals and at bedtime) Shadi Young MD      thiamine  100 mg Oral Daily RANDAL Anders          Today, Patient Was Seen By: Lee Thurman MD    ** Please Note: Dictation voice to text software may have been used in the creation of this document. **

## 2024-01-28 NOTE — PROGRESS NOTES
"NEPHROLOGY PROGRESS NOTE    Patient: Jazmin Jean               Sex: female          DOA: 1/25/2024  6:19 PM   YOB: 1955        Age:  68 y.o.        LOS:  LOS: 3 days   1/28/2024    REASON FOR THE CONSULTATION: Hyponatremia    SUBJECTIVE     Patient was seen and examined at the bedside today.  Reports ongoing nausea, poor appetite, and diarrhea.  Reports overall fatigue and states that she \"always feels this way with low sodium.\"    Reviewed past 24 hour events.    CURRENT MEDICATIONS       Current Facility-Administered Medications:     acetaminophen (TYLENOL) tablet 650 mg, 650 mg, Oral, Q6H PRN, Vy [place]-Giovanni, CRNP, 650 mg at 01/27/24 1030    amLODIPine (NORVASC) tablet 10 mg, 10 mg, Oral, Daily, Vy Felicity-Giovanni, CRNP, 10 mg at 01/28/24 0916    calcium carbonate (TUMS) chewable tablet 1,000 mg, 1,000 mg, Oral, Daily PRN, Vy Felicity-Giovanni, CRNP    docusate sodium (COLACE) capsule 100 mg, 100 mg, Oral, BID, Vy Felicity-Giovanni, CRNP, 100 mg at 01/27/24 1030    enoxaparin (LOVENOX) subcutaneous injection 40 mg, 40 mg, Subcutaneous, Daily, Vy Felicity-Giovanni, CRNP, 40 mg at 01/28/24 0917    folic acid (FOLVITE) tablet 1 mg, 1 mg, Oral, Daily, Vy Edilma-Giovanni, CRNP, 1 mg at 01/28/24 0916    melatonin tablet 6 mg, 6 mg, Oral, HS PRN, Claudette Glover PA-C, 6 mg at 01/26/24 2236    metoclopramide (REGLAN) injection 10 mg, 10 mg, Intravenous, Q6H PRN, Vy Edilma-[place], CRNP, 10 mg at 01/28/24 0917    metoprolol succinate (TOPROL-XL) 24 hr tablet 25 mg, 25 mg, Oral, TID, Vy Felicity-Giovanni, CRNP, 25 mg at 01/28/24 0916    multivitamin-minerals (CENTRUM) tablet 1 tablet, 1 tablet, Oral, Daily, RANDAL Anders, 1 tablet at 01/28/24 0916    pantoprazole (PROTONIX) EC tablet 40 mg, 40 mg, Oral, Daily, RANDAL Anders, 40 mg at 01/28/24 0916    sertraline (ZOLOFT) tablet 100 mg, 100 mg, Oral, HS, Vy" RANDAL Daily, 100 mg at 01/27/24 2116    sodium chloride (OCEAN) 0.65 % nasal spray 1 spray, 1 spray, Each Nare, Q1H PRN, Vy RANDAL Daily    sodium chloride tablet 3 g, 3 g, Oral, 4x Daily (with meals and at bedtime), Shadi Young MD, 3 g at 01/28/24 1232    thiamine tablet 100 mg, 100 mg, Oral, Daily, Vy RANDAL Daily, 100 mg at 01/28/24 0916    REVIEW OF SYSTEMS     Review of Systems   Constitutional:  Positive for activity change, appetite change and fatigue. Negative for chills and fever.   HENT:  Negative for hearing loss, nosebleeds and trouble swallowing.    Respiratory:  Negative for cough and shortness of breath.    Cardiovascular:  Negative for chest pain and leg swelling.   Gastrointestinal:  Positive for diarrhea and nausea. Negative for constipation and vomiting.   Genitourinary:  Negative for difficulty urinating, dysuria, frequency and hematuria.   Musculoskeletal:  Negative for back pain.   Skin:  Negative for pallor.   Neurological:  Positive for weakness. Negative for dizziness, syncope and light-headedness.   Psychiatric/Behavioral:  Negative for sleep disturbance. The patient is not nervous/anxious.        OBJECTIVE     Current Weight: Weight - Scale: 91.6 kg (201 lb 15.1 oz)  Vitals:    01/28/24 0727   BP: 142/90   Pulse: 98   Resp:    Temp: 98.2 °F (36.8 °C)   SpO2: 96%     Body mass index is 34.66 kg/m².    Intake/Output Summary (Last 24 hours) at 1/28/2024 1236  Last data filed at 1/27/2024 1510  Gross per 24 hour   Intake 480 ml   Output 150 ml   Net 330 ml       PHYSICAL EXAMINATION     Physical Exam  Vitals reviewed.   Constitutional:       General: She is not in acute distress.  HENT:      Head: Normocephalic.      Mouth/Throat:      Lips: Pink.      Mouth: Mucous membranes are moist.   Eyes:      General: Lids are normal. No scleral icterus.  Cardiovascular:      Rate and Rhythm: Normal rate and regular rhythm.      Heart sounds: S1 normal and S2  normal.   Pulmonary:      Effort: Pulmonary effort is normal. No accessory muscle usage or respiratory distress.      Breath sounds: Normal breath sounds.   Abdominal:      General: There is no distension.      Tenderness: There is no abdominal tenderness.   Musculoskeletal:      Cervical back: Normal range of motion and neck supple. No tenderness.      Right lower leg: No edema.      Left lower leg: No edema.   Skin:     General: Skin is warm.      Coloration: Skin is not cyanotic or jaundiced.   Neurological:      General: No focal deficit present.      Mental Status: She is alert and oriented to person, place, and time.   Psychiatric:         Attention and Perception: Attention normal.         Speech: Speech normal.         Behavior: Behavior is cooperative.           LAB RESULTS     Results from last 7 days   Lab Units 01/28/24  0523 01/27/24  2208 01/27/24  1414 01/27/24  0522 01/27/24  0005 01/26/24  1558 01/26/24  0835 01/25/24  2206 01/25/24  1906   WBC Thousand/uL 5.07  --   --  4.72  --   --   --   --  3.73*   HEMOGLOBIN g/dL 13.3  --   --  13.5  --   --   --   --  13.1   HEMATOCRIT % 37.0  --   --  36.1  --   --   --   --  36.0   PLATELETS Thousands/uL 106*  --   --  105*  --   --   --  110* 122*   SODIUM mmol/L 128* 129* 128* 125* 125* 123* 123* 122* 120*   POTASSIUM mmol/L 3.5 2.7* 3.2* 3.3* 2.7* 2.8* 3.2* 3.5 3.7   CHLORIDE mmol/L 94* 94* 96 91* 91* 89* 89* 92* 89*   CO2 mmol/L 22 24 20* 23 21 21 21 15* 14*   BUN mg/dL 4* 4* 4* 3* 4* 4* 5 9 12   CREATININE mg/dL 0.71 0.71 0.69 0.66 0.66 0.70 0.73 0.78 0.89   EGFR ml/min/1.73sq m 88 88 90 91 91 89 85 78 67   CALCIUM mg/dL 8.6 8.4 8.6 8.6 8.3* 8.6 8.2* 7.9* 8.6       RADIOLOGY RESULTS      CT of the abdomen and pelvis without contrast 1/25/2024:  IMPRESSION:        No evidence of proctitis or perirectal inflammation. No findings to indicate diverticulitis or colitis      ASSESSMENT/PLAN     68-year-old female with a past medical history significant for  "hypertension, hyponatremia secondary to underlying SIADH, obesity, and alcohol use who presents to our facility with epistaxis, dizziness, and tremors and found to have hyponatremia.     Hyponatremia:  Patient chronically on oral salt tablets 2 g twice daily at home.  Workup revealing for underlying SIADH of unclear etiology.  Presented with a sodium level of 120 and low, suspect hyponatremia exacerbation in setting of alcohol use.  Workup revealing for urine osmolality of 423 and urine sodium 73, indicative of underlying SIADH etiology which may likely be in the setting of sertraline administration.     He is currently maintained on oral salt tablets 3 g 4 times daily and received a dose of IV furosemide yesterday. Sodium level slow to rise and only 128 mEq/L on labs today. She reports ongoing weakness, nausea, poor appetite, and diarrhea.  Patient reports she frequently has these symptoms when experiencing hyponatremia.    If no continued improvement in sodium levels on 1400 labs, we will transition patient to 1.8% saline for further sodium correction at a rate of 40 mL/h. Continue to monitor BMP every 8 hours for management. Goal sodium level 135 mEq/L by tomorrow morning.      Hypokalemia:  Calcium low overnight at 2.7, following repletion improved at 3.5.     Hypertension:  Currently maintained on amlodipine 10 mg daily and metoprolol 25 mg 3 times daily.  Blood pressure readings within acceptable range, currently 142/90.      RANDAL Boland  Nephrology  1/28/2024      Portions of the record may have been created with voice recognition software. Occasional wrong word or \"sound a like\" substitutions may have occurred due to the inherent limitations of voice recognition software. Read the chart carefully and recognize, using context, where substitutions have occurred.   "

## 2024-01-29 VITALS
TEMPERATURE: 98 F | BODY MASS INDEX: 34.59 KG/M2 | HEIGHT: 64 IN | OXYGEN SATURATION: 97 % | HEART RATE: 84 BPM | WEIGHT: 202.6 LBS | DIASTOLIC BLOOD PRESSURE: 76 MMHG | SYSTOLIC BLOOD PRESSURE: 144 MMHG | RESPIRATION RATE: 18 BRPM

## 2024-01-29 LAB
ALBUMIN SERPL BCP-MCNC: 3.9 G/DL (ref 3.5–5)
ALP SERPL-CCNC: 85 U/L (ref 34–104)
ALT SERPL W P-5'-P-CCNC: 67 U/L (ref 7–52)
ANION GAP SERPL CALCULATED.3IONS-SCNC: 9 MMOL/L
AST SERPL W P-5'-P-CCNC: 88 U/L (ref 13–39)
BASOPHILS # BLD AUTO: 0.03 THOUSANDS/ÂΜL (ref 0–0.1)
BASOPHILS NFR BLD AUTO: 1 % (ref 0–1)
BILIRUB DIRECT SERPL-MCNC: 0.34 MG/DL (ref 0–0.2)
BILIRUB SERPL-MCNC: 1.1 MG/DL (ref 0.2–1)
BUN SERPL-MCNC: 4 MG/DL (ref 5–25)
CALCIUM SERPL-MCNC: 8.4 MG/DL (ref 8.4–10.2)
CHLORIDE SERPL-SCNC: 96 MMOL/L (ref 96–108)
CO2 SERPL-SCNC: 24 MMOL/L (ref 21–32)
CREAT SERPL-MCNC: 0.65 MG/DL (ref 0.6–1.3)
EOSINOPHIL # BLD AUTO: 0.02 THOUSAND/ÂΜL (ref 0–0.61)
EOSINOPHIL NFR BLD AUTO: 1 % (ref 0–6)
ERYTHROCYTE [DISTWIDTH] IN BLOOD BY AUTOMATED COUNT: 13.4 % (ref 11.6–15.1)
GFR SERPL CREATININE-BSD FRML MDRD: 92 ML/MIN/1.73SQ M
GLUCOSE SERPL-MCNC: 123 MG/DL (ref 65–140)
HCT VFR BLD AUTO: 32.4 % (ref 34.8–46.1)
HGB BLD-MCNC: 11.6 G/DL (ref 11.5–15.4)
IMM GRANULOCYTES # BLD AUTO: 0.07 THOUSAND/UL (ref 0–0.2)
IMM GRANULOCYTES NFR BLD AUTO: 2 % (ref 0–2)
LYMPHOCYTES # BLD AUTO: 1.43 THOUSANDS/ÂΜL (ref 0.6–4.47)
LYMPHOCYTES NFR BLD AUTO: 32 % (ref 14–44)
MCH RBC QN AUTO: 34.7 PG (ref 26.8–34.3)
MCHC RBC AUTO-ENTMCNC: 35.8 G/DL (ref 31.4–37.4)
MCV RBC AUTO: 97 FL (ref 82–98)
MONOCYTES # BLD AUTO: 0.61 THOUSAND/ÂΜL (ref 0.17–1.22)
MONOCYTES NFR BLD AUTO: 14 % (ref 4–12)
NEUTROPHILS # BLD AUTO: 2.25 THOUSANDS/ÂΜL (ref 1.85–7.62)
NEUTS SEG NFR BLD AUTO: 50 % (ref 43–75)
NRBC BLD AUTO-RTO: 0 /100 WBCS
PLATELET # BLD AUTO: 115 THOUSANDS/UL (ref 149–390)
PMV BLD AUTO: 9.6 FL (ref 8.9–12.7)
POTASSIUM SERPL-SCNC: 3.3 MMOL/L (ref 3.5–5.3)
PROT SERPL-MCNC: 6.3 G/DL (ref 6.4–8.4)
RBC # BLD AUTO: 3.34 MILLION/UL (ref 3.81–5.12)
SODIUM SERPL-SCNC: 129 MMOL/L (ref 135–147)
WBC # BLD AUTO: 4.41 THOUSAND/UL (ref 4.31–10.16)

## 2024-01-29 PROCEDURE — 85025 COMPLETE CBC W/AUTO DIFF WBC: CPT | Performed by: INTERNAL MEDICINE

## 2024-01-29 PROCEDURE — 80048 BASIC METABOLIC PNL TOTAL CA: CPT | Performed by: INTERNAL MEDICINE

## 2024-01-29 PROCEDURE — 99239 HOSP IP/OBS DSCHRG MGMT >30: CPT | Performed by: INTERNAL MEDICINE

## 2024-01-29 PROCEDURE — 99232 SBSQ HOSP IP/OBS MODERATE 35: CPT | Performed by: INTERNAL MEDICINE

## 2024-01-29 PROCEDURE — 80076 HEPATIC FUNCTION PANEL: CPT | Performed by: INTERNAL MEDICINE

## 2024-01-29 RX ORDER — POTASSIUM CHLORIDE 14.9 MG/ML
20 INJECTION INTRAVENOUS
Status: COMPLETED | OUTPATIENT
Start: 2024-01-29 | End: 2024-01-29

## 2024-01-29 RX ORDER — SODIUM CHLORIDE 1 G/1
3 TABLET ORAL
Qty: 360 TABLET | Refills: 0 | Status: SHIPPED | OUTPATIENT
Start: 2024-01-29

## 2024-01-29 RX ORDER — LOPERAMIDE HYDROCHLORIDE 2 MG/1
2 CAPSULE ORAL 3 TIMES DAILY PRN
Status: DISCONTINUED | OUTPATIENT
Start: 2024-01-29 | End: 2024-01-29 | Stop reason: HOSPADM

## 2024-01-29 RX ADMIN — LOPERAMIDE HYDROCHLORIDE 2 MG: 2 CAPSULE ORAL at 06:23

## 2024-01-29 RX ADMIN — THIAMINE HCL TAB 100 MG 100 MG: 100 TAB at 08:59

## 2024-01-29 RX ADMIN — PANTOPRAZOLE SODIUM 40 MG: 40 TABLET, DELAYED RELEASE ORAL at 08:59

## 2024-01-29 RX ADMIN — Medication 3 G: at 08:59

## 2024-01-29 RX ADMIN — FOLIC ACID 1 MG: 1 TABLET ORAL at 08:59

## 2024-01-29 RX ADMIN — Medication 3 G: at 13:00

## 2024-01-29 RX ADMIN — POTASSIUM CHLORIDE 20 MEQ: 200 INJECTION, SOLUTION INTRAVENOUS at 11:00

## 2024-01-29 RX ADMIN — Medication 1 TABLET: at 08:59

## 2024-01-29 RX ADMIN — ENOXAPARIN SODIUM 40 MG: 40 INJECTION SUBCUTANEOUS at 09:21

## 2024-01-29 RX ADMIN — POTASSIUM CHLORIDE 20 MEQ: 200 INJECTION, SOLUTION INTRAVENOUS at 08:59

## 2024-01-29 RX ADMIN — METOCLOPRAMIDE HYDROCHLORIDE 10 MG: 5 INJECTION INTRAMUSCULAR; INTRAVENOUS at 09:04

## 2024-01-29 RX ADMIN — METOPROLOL SUCCINATE 25 MG: 25 TABLET, EXTENDED RELEASE ORAL at 08:59

## 2024-01-29 RX ADMIN — AMLODIPINE BESYLATE 10 MG: 10 TABLET ORAL at 08:59

## 2024-01-29 NOTE — DISCHARGE SUMMARY
[facility]  Discharge- Jazmin Jean 1955, 68 y.o. female MRN: [id number]  Unit/Bed#: [facility]-01 Encounter: [id number]  Primary Care Provider: Deidra Verma NP   Date and time admitted to hospital: 1/25/2024  6:19 PM    * Hyponatremia  Assessment & Plan  Suspect secondary to SIADH  Slowly improving with water restriction and salt tabs  Sodium has since improved and stabilized at approxione 29  Continue salt tabs on discharge          Diarrhea  Assessment & Plan  Resolved likely viral in etiology    BRBPR (bright red blood per rectum)  Assessment & Plan  Hemoglobin is since stabilized    SIADH (syndrome of inappropriate ADH production) ([facility])  Assessment & Plan  Continue salt tabs 3 g 4 times daily    Essential hypertension  Assessment & Plan  Blood pressure controlled  Continue amlodipine and metoprolol    Anxiety  Assessment & Plan  Continue SSRI      Discharging Physician / Practitioner: Lee Thurman MD  PCP: Deidra Verma NP  Admission Date:   Admission Orders (From admission, onward)       Ordered        01/25/24 2049  INPATIENT ADMISSION  Once                          Discharge Date: 01/29/24    Medical Problems       Resolved Problems  Date Reviewed: 1/29/2024   None         Consultations During Hospital Stay:  nephro    Procedures Performed:   none    Significant Findings / Test Results:   CT abdomen pelvis wo contrast    Result Date: 1/25/2024  Impression: No evidence of proctitis or perirectal inflammation. No findings to indicate diverticulitis or colitis. Workstation performed: [id number]      Incidental Findings:   none     Test Results Pending at Discharge (will require follow up):   none     Outpatient Tests Requested:  none    Complications:  none    Reason for Admission: Hyponatremia    Hospital Course:     Jazmin Jean is a 68 y.o. female patient who originally presented to the hospital on 1/25/2024 due to hyponatremia secondary to SIADH was treated with salt tabs  "and water restriction with significant improvement.  Will have outpatient follow-up with nephrology and outpatient BMP in 2 weeks      Please see above list of diagnoses and related plan for additional information.     Condition at Discharge: stable     Discharge Day Visit / Exam:     Subjective: Denies chest pain, shortness of breath, diarrhea, fevers, chills  Vitals: Blood Pressure: 144/76 (01/29/24 0717)  Pulse: 84 (01/29/24 0717)  Temperature: 98 °F (36.7 °C) (01/29/24 0717)  Temp Source: Oral (01/25/24 2259)  Respirations: 18 (01/29/24 0717)  Height: 5' 4\" (162.6 cm) (01/25/24 2259)  Weight - Scale: 91.9 kg (202 lb 9.6 oz) (01/29/24 0600)  SpO2: 97 % (01/29/24 0717)  Exam:   Physical Exam  Constitutional:       General: She is not in acute distress.     Appearance: She is well-developed. She is not diaphoretic.   HENT:      Head: Normocephalic and atraumatic.      Nose: Nose normal.      Mouth/Throat:      Pharynx: No oropharyngeal exudate.   Eyes:      General: No scleral icterus.        Right eye: No discharge.         Left eye: No discharge.      Conjunctiva/sclera: Conjunctivae normal.   Neck:      Thyroid: No thyromegaly.      Vascular: No JVD.   Cardiovascular:      Rate and Rhythm: Normal rate and regular rhythm.      Heart sounds: Normal heart sounds. No murmur heard.     No friction rub. No gallop.   Pulmonary:      Effort: Pulmonary effort is normal. No respiratory distress.      Breath sounds: Normal breath sounds. No wheezing or rales.   Chest:      Chest wall: No tenderness.   Abdominal:      General: Bowel sounds are normal. There is no distension.      Palpations: Abdomen is soft.      Tenderness: There is no abdominal tenderness. There is no guarding or rebound.   Musculoskeletal:         General: No tenderness or deformity. Normal range of motion.      Cervical back: Normal range of motion and neck supple.   Skin:     General: Skin is warm and dry.      Findings: No erythema or rash. "   Neurological:      Mental Status: She is alert. Mental status is at baseline.      Cranial Nerves: No cranial nerve deficit.      Sensory: No sensory deficit.      Motor: No abnormal muscle tone.      Coordination: Coordination normal.         Discussion with Family: pt, declined family update    Discharge instructions/Information to patient and family:   See after visit summary for information provided to patient and family.      Provisions for Follow-Up Care:  See after visit summary for information related to follow-up care and any pertinent home health orders.      Disposition:     Home    For Discharges to [facility]:   Not Applicable to this Patient - Not Applicable to this Patient    Planned Readmission: none     Discharge Statement:  I spent 60 minutes discharging the patient. This time was spent on the day of discharge. I had direct contact with the patient on the day of discharge. Greater than 50% of the total time was spent examining patient, answering all patient questions, arranging and discussing plan of care with patient as well as directly providing post-discharge instructions.  Additional time then spent on discharge activities.    Discharge Medications:  See after visit summary for reconciled discharge medications provided to patient and family.      ** Please Note: This note has been constructed using a voice recognition system **

## 2024-01-29 NOTE — ASSESSMENT & PLAN NOTE
Suspect secondary to SIADH  Slowly improving with water restriction and salt tabs  Sodium has since improved and stabilized at approxione 29  Continue salt tabs on discharge

## 2024-01-29 NOTE — PLAN OF CARE
Problem: PAIN - ADULT  Goal: Verbalizes/displays adequate comfort level or baseline comfort level  Description: Interventions:  - Encourage patient to monitor pain and request assistance  - Assess pain using appropriate pain scale  - Administer analgesics based on type and severity of pain and evaluate response  - Implement non-pharmacological measures as appropriate and evaluate response  - Consider cultural and social influences on pain and pain management  - Notify physician/advanced practitioner if interventions unsuccessful or patient reports new pain  Outcome: Progressing     Problem: INFECTION - ADULT  Goal: Absence or prevention of progression during hospitalization  Description: INTERVENTIONS:  - Assess and monitor for signs and symptoms of infection  - Monitor lab/diagnostic results  - Monitor all insertion sites, i.e. indwelling lines, tubes, and drains  - Monitor endotracheal if appropriate and nasal secretions for changes in amount and color  - [place] appropriate cooling/warming therapies per order  - Administer medications as ordered  - Instruct and encourage patient and family to use good hand hygiene technique  - Identify and instruct in appropriate isolation precautions for identified infection/condition  Outcome: Progressing  Goal: Absence of fever/infection during neutropenic period  Description: INTERVENTIONS:  - Monitor WBC    Outcome: Progressing     Problem: SAFETY ADULT  Goal: Patient will remain free of falls  Description: INTERVENTIONS:  - Educate patient/family on patient safety including physical limitations  - Instruct patient to call for assistance with activity   - Consult OT/PT to assist with strengthening/mobility   - Keep Call bell within reach  - Keep bed low and locked with side rails adjusted as appropriate  - Keep care items and personal belongings within reach  - Initiate and maintain comfort rounds  - Make Fall Risk Sign visible to staff  - Offer Toileting every 2 Hours,  in advance of need  - Apply yellow socks and bracelet for high fall risk patients  - Consider moving patient to room near nurses station  Outcome: Progressing  Goal: Maintain or return to baseline ADL function  Description: INTERVENTIONS:  -  Assess patient's ability to carry out ADLs; assess patient's baseline for ADL function and identify physical deficits which impact ability to perform ADLs (bathing, care of mouth/teeth, toileting, grooming, dressing, etc.)  - Assess/evaluate cause of self-care deficits   - Assess range of motion  - Assess patient's mobility; develop plan if impaired  - Assess patient's need for assistive devices and provide as appropriate  - Encourage maximum independence but intervene and supervise when necessary  - Involve family in performance of ADLs  - Assess for home care needs following discharge   - Consider OT consult to assist with ADL evaluation and planning for discharge  - Provide patient education as appropriate  Outcome: Progressing  Goal: Maintains/Returns to pre admission functional level  Description: INTERVENTIONS:  - Perform AM-PAC 6 Click Basic Mobility/ Daily Activity assessment daily.  - Set and communicate daily mobility goal to care team and patient/family/caregiver.   - Collaborate with rehabilitation services on mobility goals if consulted  - Perform Range of Motion 2 times a day.  - Reposition patient every 2 hours.  - Dangle patient 2 times a day  - Stand patient 2 times a day  - Ambulate patient 2 times a day  - Out of bed to chair 2 times a day   - Out of bed for meals 2 times a day  - Out of bed for toileting  - Record patient progress and toleration of activity level   Outcome: Progressing     Problem: DISCHARGE PLANNING  Goal: Discharge to home or other facility with appropriate resources  Description: INTERVENTIONS:  - Identify barriers to discharge w/patient and caregiver  - Arrange for needed discharge resources and transportation as appropriate  - Identify  discharge learning needs (meds, wound care, etc.)  - Arrange for interpretive services to assist at discharge as needed  - Refer to Case Management Department for coordinating discharge planning if the patient needs post-hospital services based on physician/advanced practitioner order or complex needs related to functional status, cognitive ability, or social support system  Outcome: Progressing     Problem: Knowledge Deficit  Goal: Patient/family/caregiver demonstrates understanding of disease process, treatment plan, medications, and discharge instructions  Description: Complete learning assessment and assess knowledge base.  Interventions:  - Provide teaching at level of understanding  - Provide teaching via preferred learning methods  Outcome: Progressing     Problem: Prexisting or High Potential for Compromised Skin Integrity  Goal: Skin integrity is maintained or improved  Description: INTERVENTIONS:  - Identify patients at risk for skin breakdown  - Assess and monitor skin integrity  - Assess and monitor nutrition and hydration status  - Monitor labs   - Assess for incontinence   - Turn and reposition patient  - Assist with mobility/ambulation  - Relieve pressure over bony prominences  - Avoid friction and shearing  - Provide appropriate hygiene as needed including keeping skin clean and dry  - Evaluate need for skin moisturizer/barrier cream  - Collaborate with interdisciplinary team   - Patient/family teaching  - Consider wound care consult   Outcome: Progressing

## 2024-01-29 NOTE — CASE MANAGEMENT
"   Case Management Discharge Planning Note    Patient name Jazmin Jean  Location /-01 MRN [id number]  : 1955 Date 2024       Current Admission Date: 2024  Current Admission Diagnosis:Hyponatremia   Patient Active Problem List    Diagnosis Date Noted    Diarrhea 2024    SIADH (syndrome of inappropriate ADH production) (HCC) 2024    BRBPR (bright red blood per rectum) 2024    Epistaxis 2024    Abnormal liver enzymes 2024    Hypokalemia 2023    Hypomagnesemia 2023    Vitamin D deficiency 2022    Elevated troponin level not due to acute coronary syndrome 2022    Hyponatremia 2021    Anxiety 01/15/2018    Chronic migraine without aura without status migrainosus, not intractable 01/15/2018    Chronic pain 01/15/2018    Essential hypertension 01/15/2018      LOS (days): 4  Geometric Mean LOS (GMLOS) (days): 3.6  Days to GMLOS:-0.1     OBJECTIVE:  Risk of Unplanned Readmission Score: 10.78         Current admission status: Inpatient   Preferred Pharmacy:   [facility]/pharmacy #[id number] [address] [address]  Phone: [phone] Fax: [phone]    Primary Care Provider: Deidra Verma NP    Primary Insurance: [id number]  Secondary Insurance:     DISCHARGE DETAILS:                                          Other Referral/Resources/Interventions Provided:  Referral Comments: patient states unsure if wants to go home today \"I might need one more night\". SLIM informed                                             IMM Given (Date):: 24  IMM Given to:: Patient     Additional Comments: IMM and Medicare rights reviewed with patient at the bedside. Patient verbalized understanding and signed original. Copy given to patient, signed original filed to medical records.                      "

## 2024-01-29 NOTE — PROGRESS NOTES
NEPHROLOGY PROGRESS NOTE    Patient: Jazmin Jean               Sex: female          DOA: 1/25/2024  6:19 PM   YOB: 1955        Age:  68 y.o.        LOS:  LOS: 4 days   1/29/2024    REASON FOR THE CONSULTATION:      Hyponatremia    SUBJECTIVE     Patient denies any diarrhea. Standing up next to bed.    CURRENT MEDICATIONS       Current Facility-Administered Medications:     acetaminophen (TYLENOL) tablet 650 mg, 650 mg, Oral, Q6H PRN, Vy France-Giovanni, CRNP, 650 mg at 01/27/24 1030    amLODIPine (NORVASC) tablet 10 mg, 10 mg, Oral, Daily, Vy France-Giovanni, CRNP, 10 mg at 01/29/24 0859    calcium carbonate (TUMS) chewable tablet 1,000 mg, 1,000 mg, Oral, Daily PRN, Vy Daily, CRNP    enoxaparin (LOVENOX) subcutaneous injection 40 mg, 40 mg, Subcutaneous, Daily, Vy France-Giovanni, CRNP, 40 mg at 01/29/24 0921    folic acid (FOLVITE) tablet 1 mg, 1 mg, Oral, Daily, Vy France-Giovanni, CRNP, 1 mg at 01/29/24 0859    loperamide (IMODIUM) capsule 2 mg, 2 mg, Oral, TID PRN, IVONNE LeeNP, 2 mg at 01/29/24 0623    melatonin tablet 6 mg, 6 mg, Oral, HS PRN, Claudette Glover PA-C, 6 mg at 01/26/24 2236    metoclopramide (REGLAN) injection 10 mg, 10 mg, Intravenous, Q6H PRN, Vy Daily, CRNP, 10 mg at 01/29/24 0904    metoprolol succinate (TOPROL-XL) 24 hr tablet 25 mg, 25 mg, Oral, TID, Vy Daily, CRNP, 25 mg at 01/29/24 0859    multivitamin-minerals (CENTRUM) tablet 1 tablet, 1 tablet, Oral, Daily, Vy Daily CRNP, 1 tablet at 01/29/24 0859    pantoprazole (PROTONIX) EC tablet 40 mg, 40 mg, Oral, Daily, RANDAL Anders, 40 mg at 01/29/24 0859    potassium chloride 20 mEq IVPB (premix), 20 mEq, Intravenous, Q2H, Shadi Young MD, Last Rate: 50 mL/hr at 01/29/24 0859, 20 mEq at 01/29/24 0859    sertraline (ZOLOFT) tablet 100 mg, 100 mg, Oral, HS, RANDAL Anders, 100 mg at 01/28/24 8834     sodium chloride (OCEAN) 0.65 % nasal spray 1 spray, 1 spray, Each Nare, Q1H PRN, RANDAL Anders    sodium chloride tablet 3 g, 3 g, Oral, 4x Daily (with meals and at bedtime), Shadi Young MD, 3 g at 01/29/24 0859    thiamine tablet 100 mg, 100 mg, Oral, Daily, RANDAL Anders, 100 mg at 01/29/24 0859    REVIEW OF SYSTEMS     Review of Systems   Constitutional: Negative.    HENT: Negative.     Eyes: Negative.    Respiratory: Negative.     Cardiovascular: Negative.    Gastrointestinal: Negative.    Endocrine: Negative.    Genitourinary: Negative.    Musculoskeletal: Negative.    Skin: Negative.    Allergic/Immunologic: Negative.    Neurological: Negative.    Hematological: Negative.    All other systems reviewed and are negative.      OBJECTIVE     Current Weight: Weight - Scale: 91.9 kg (202 lb 9.6 oz)  Vitals:    01/29/24 0717   BP: 144/76   Pulse: 84   Resp: 18   Temp: 98 °F (36.7 °C)   SpO2: 97%     Body mass index is 34.78 kg/m².    Intake/Output Summary (Last 24 hours) at 1/29/2024 1028  Last data filed at 1/28/2024 1839  Gross per 24 hour   Intake 960 ml   Output --   Net 960 ml       PHYSICAL EXAMINATION     Physical Exam  Constitutional:       Appearance: She is well-developed.   HENT:      Head: Normocephalic and atraumatic.   Eyes:      Pupils: Pupils are equal, round, and reactive to light.   Cardiovascular:      Rate and Rhythm: Normal rate and regular rhythm.      Heart sounds: Normal heart sounds.   Pulmonary:      Effort: Pulmonary effort is normal.   Abdominal:      General: Bowel sounds are normal.      Palpations: Abdomen is soft.   Musculoskeletal:         General: Normal range of motion.      Cervical back: Neck supple.   Skin:     General: Skin is warm.   Neurological:      Mental Status: She is alert and oriented to person, place, and time.           LAB RESULTS     Results from last 7 days   Lab Units 01/29/24  0438 01/29/24  0427 01/28/24  2158 01/28/24  1309  01/28/24  0523 01/27/24 2208 01/27/24  1414 01/27/24  0522 01/26/24  0835 01/25/24  2206 01/25/24  1906   WBC Thousand/uL  --  4.41  --   --  5.07  --   --  4.72  --   --  3.73*   HEMOGLOBIN g/dL  --  11.6  --   --  13.3  --   --  13.5  --   --  13.1   HEMATOCRIT %  --  32.4*  --   --  37.0  --   --  36.1  --   --  36.0   PLATELETS Thousands/uL  --  115*  --   --  106*  --   --  105*  --  110* 122*   POTASSIUM mmol/L 3.3*  --  3.1* 3.1* 3.5 2.7* 3.2* 3.3*   < > 3.5 3.7   CHLORIDE mmol/L 96  --  95* 94* 94* 94* 96 91*   < > 92* 89*   CO2 mmol/L 24  --  25 22 22 24 20* 23   < > 15* 14*   BUN mg/dL 4*  --  4* 4* 4* 4* 4* 3*   < > 9 12   CREATININE mg/dL 0.65  --  0.63 0.68 0.71 0.71 0.69 0.66   < > 0.78 0.89   EGFR ml/min/1.73sq m 92  --  93 90 88 88 90 91   < > 78 67   CALCIUM mg/dL 8.4  --  8.6 8.7 8.6 8.4 8.6 8.6   < > 7.9* 8.6    < > = values in this interval not displayed.           RADIOLOGY RESULTS        IMPRESSION:        No evidence of proctitis or perirectal inflammation. No findings to indicate diverticulitis or colitis.    ASSESSMENT/PLAN     68 F with PMH of SIADH causing hyponatremia, Hypertension, depression, ETOH abuse who presents with tremors, BRBPR, dizziness and found to have a sodium level of 120    1) Hyponatremia: History of hyponatremia secondary to SIADH.  Maintained on salt tablets at home at a dose of 1 g p.o. 3 times daily.  Presented with serum sodium of 120 mEq/L and low.  Workup revealed elevated urine sodium and elevated osmolality suggestive of SIADH.  Initiated on salt tablets 3 g p.o. 4 times daily.  Sodium level was 129 and improving slowly.  Replacing potassium that may help raise serum sodium level as well.  Patient is acceptable from my end to be discharged if necessary.  Will recommend maintaining her on current dose of salt tablets with weaning off of SSRI agent.    #2 hypokalemia: Etiology is likely decreased p.o. intake.  Currently getting IV potassium chloride.    3.   Hypertension: Blood pressure acceptable at 144/76 this morning.              Shadi Young MD  Nephrology  1/29/2024

## 2024-01-30 ENCOUNTER — HOME CARE VISIT (OUTPATIENT)
Dept: HOME HEALTH SERVICES | Facility: HOME HEALTHCARE | Age: 69
End: 2024-01-30

## 2024-02-14 NOTE — UTILIZATION REVIEW
NOTIFICATION OF ADMISSION DISCHARGE   This is a Notification of Discharge from [facility]. Please be advised that this patient has been discharge from our facility. Below you will find the admission and discharge date and time including the patient’s disposition.   UTILIZATION REVIEW CONTACT:  Elysia Carlisle  Utilization   Network Utilization Review Department  Phone: [phone] x carefully listen to the prompts. All voicemails are confidential.  Email: [email or web address]     ADMISSION INFORMATION  PRESENTATION DATE: 1/25/2024  6:19 PM  OBERVATION ADMISSION DATE:   INPATIENT ADMISSION DATE: 1/25/24  8:49 PM   DISCHARGE DATE: 1/29/2024  2:36 PM   DISPOSITION:Home with Home Health Care    Network Utilization Review Department  ATTENTION: Please call with any questions or concerns to [phone] and carefully listen to the prompts so that you are directed to the right person. All voicemails are confidential.   For Discharge needs, contact Care Management DC Support Team at [phone] opt. [phone]  Send all requests for admission clinical reviews, approved or denied determinations and any other requests to dedicated fax number below belonging to the campus where the patient is receiving treatment. List of dedicated fax numbers for the Facilities:  FACILITY NAME UR FAX NUMBER   ADMISSION DENIALS (Administrative/Medical Necessity) [phone]   DISCHARGE SUPPORT TEAM ([facility]) [phone]   PARENT CHILD HEALTH (Maternity/NICU/Pediatrics) [phone]   [facility] [phone]   [facility] [phone]   [facility] [phone]   [facility] [phone]   [facility] [phone]   [facility] [phone]   [facility] [phone]   [facility]  [place] [phone]   [facility] [phone]   [facility] [phone]   [facility] [phone]   [facility] [phone]

## 2024-04-09 ENCOUNTER — HOSPITAL ENCOUNTER (EMERGENCY)
Facility: HOSPITAL | Age: 69
Discharge: HOME | End: 2024-04-09
Attending: EMERGENCY MEDICINE | Admitting: EMERGENCY MEDICINE
Payer: COMMERCIAL

## 2024-04-09 VITALS
TEMPERATURE: 98.2 F | RESPIRATION RATE: 19 BRPM | DIASTOLIC BLOOD PRESSURE: 66 MMHG | SYSTOLIC BLOOD PRESSURE: 129 MMHG | HEART RATE: 108 BPM | OXYGEN SATURATION: 98 %

## 2024-04-09 DIAGNOSIS — R19.7 DIARRHEA, UNSPECIFIED TYPE: Primary | ICD-10-CM

## 2024-04-09 LAB
ALBUMIN SERPL BCP-MCNC: 4.8 G/DL (ref 3.5–5)
ALP SERPL-CCNC: 60 U/L (ref 34–104)
ALT SERPL W P-5'-P-CCNC: 16 U/L (ref 7–52)
ANION GAP SERPL CALCULATED.3IONS-SCNC: 14 MMOL/L (ref 4–13)
AST SERPL W P-5'-P-CCNC: 22 U/L (ref 13–39)
ATRIAL RATE: 113 BPM
BASOPHILS # BLD AUTO: 0.06 THOUSANDS/ÂΜL (ref 0–0.1)
BASOPHILS NFR BLD AUTO: 1 % (ref 0–1)
BILIRUB SERPL-MCNC: 0.59 MG/DL (ref 0.2–1)
BILIRUB UR QL STRIP: NEGATIVE
BUN SERPL-MCNC: 9 MG/DL (ref 5–25)
CALCIUM SERPL-MCNC: 9.3 MG/DL (ref 8.4–10.2)
CHLORIDE SERPL-SCNC: 104 MMOL/L (ref 96–108)
CLARITY UR: CLEAR
CO2 SERPL-SCNC: 19 MMOL/L (ref 21–32)
COLOR UR: COLORLESS
CREAT SERPL-MCNC: 0.84 MG/DL (ref 0.6–1.3)
EOSINOPHIL # BLD AUTO: 0.02 THOUSAND/ÂΜL (ref 0–0.61)
EOSINOPHIL NFR BLD AUTO: 0 % (ref 0–6)
ERYTHROCYTE [DISTWIDTH] IN BLOOD BY AUTOMATED COUNT: 13.7 % (ref 11.6–15.1)
GFR SERPL CREATININE-BSD FRML MDRD: 72 ML/MIN/1.73SQ M
GLUCOSE SERPL-MCNC: 132 MG/DL (ref 65–140)
GLUCOSE UR STRIP-MCNC: NEGATIVE MG/DL
HCT VFR BLD AUTO: 40.3 % (ref 34.8–46.1)
HGB BLD-MCNC: 13.9 G/DL (ref 11.5–15.4)
HGB UR QL STRIP.AUTO: NEGATIVE
IMM GRANULOCYTES # BLD AUTO: 0.03 THOUSAND/UL (ref 0–0.2)
IMM GRANULOCYTES NFR BLD AUTO: 1 % (ref 0–2)
KETONES UR STRIP-MCNC: NEGATIVE MG/DL
LEUKOCYTE ESTERASE UR QL STRIP: NEGATIVE
LIPASE SERPL-CCNC: 29 U/L (ref 11–82)
LYMPHOCYTES # BLD AUTO: 1.93 THOUSANDS/ÂΜL (ref 0.6–4.47)
LYMPHOCYTES NFR BLD AUTO: 42 % (ref 14–44)
MAGNESIUM SERPL-MCNC: 1.6 MG/DL (ref 1.9–2.7)
MCH RBC QN AUTO: 35.1 PG (ref 26.8–34.3)
MCHC RBC AUTO-ENTMCNC: 34.5 G/DL (ref 31.4–37.4)
MCV RBC AUTO: 102 FL (ref 82–98)
MONOCYTES # BLD AUTO: 0.52 THOUSAND/ÂΜL (ref 0.17–1.22)
MONOCYTES NFR BLD AUTO: 11 % (ref 4–12)
NEUTROPHILS # BLD AUTO: 1.99 THOUSANDS/ÂΜL (ref 1.85–7.62)
NEUTS SEG NFR BLD AUTO: 45 % (ref 43–75)
NITRITE UR QL STRIP: NEGATIVE
NRBC BLD AUTO-RTO: 0 /100 WBCS
P AXIS: 56 DEGREES
PH UR STRIP.AUTO: 5.5 [PH]
PHOSPHATE SERPL-MCNC: 2.2 MG/DL (ref 2.3–4.1)
PLATELET # BLD AUTO: 245 THOUSANDS/UL (ref 149–390)
PMV BLD AUTO: 9.2 FL (ref 8.9–12.7)
POTASSIUM SERPL-SCNC: 3.5 MMOL/L (ref 3.5–5.3)
PR INTERVAL: 150 MS
PROT SERPL-MCNC: 7.5 G/DL (ref 6.4–8.4)
PROT UR STRIP-MCNC: NEGATIVE MG/DL
QRS AXIS: 26 DEGREES
QRSD INTERVAL: 80 MS
QT INTERVAL: 352 MS
QTC INTERVAL: 482 MS
RBC # BLD AUTO: 3.96 MILLION/UL (ref 3.81–5.12)
SODIUM SERPL-SCNC: 137 MMOL/L (ref 135–147)
SP GR UR STRIP.AUTO: 1.01 (ref 1–1.03)
T WAVE AXIS: 38 DEGREES
UROBILINOGEN UR STRIP-ACNC: <2 MG/DL
VENTRICULAR RATE: 113 BPM
WBC # BLD AUTO: 4.55 THOUSAND/UL (ref 4.31–10.16)

## 2024-04-09 PROCEDURE — 83690 ASSAY OF LIPASE: CPT

## 2024-04-09 PROCEDURE — 36415 COLL VENOUS BLD VENIPUNCTURE: CPT

## 2024-04-09 PROCEDURE — 80053 COMPREHEN METABOLIC PANEL: CPT

## 2024-04-09 PROCEDURE — 81003 URINALYSIS AUTO W/O SCOPE: CPT

## 2024-04-09 PROCEDURE — 93005 ELECTROCARDIOGRAM TRACING: CPT

## 2024-04-09 PROCEDURE — 93010 ELECTROCARDIOGRAM REPORT: CPT | Performed by: INTERNAL MEDICINE

## 2024-04-09 PROCEDURE — 83735 ASSAY OF MAGNESIUM: CPT

## 2024-04-09 PROCEDURE — 84100 ASSAY OF PHOSPHORUS: CPT

## 2024-04-09 PROCEDURE — 85025 COMPLETE CBC W/AUTO DIFF WBC: CPT

## 2024-04-09 RX ORDER — CITALOPRAM 40 MG/1
40 TABLET ORAL DAILY
COMMUNITY

## 2024-04-09 RX ORDER — DICYCLOMINE HCL 20 MG
20 TABLET ORAL ONCE
Status: COMPLETED | OUTPATIENT
Start: 2024-04-09 | End: 2024-04-09

## 2024-04-09 RX ORDER — DICYCLOMINE HCL 20 MG
20 TABLET ORAL ONCE AS NEEDED
Qty: 20 TABLET | Refills: 0 | Status: SHIPPED | OUTPATIENT
Start: 2024-04-09

## 2024-04-09 RX ORDER — POTASSIUM CHLORIDE 20 MEQ/1
40 TABLET, EXTENDED RELEASE ORAL ONCE
Status: COMPLETED | OUTPATIENT
Start: 2024-04-09 | End: 2024-04-09

## 2024-04-09 RX ORDER — ONDANSETRON 2 MG/ML
4 INJECTION INTRAMUSCULAR; INTRAVENOUS ONCE
Status: COMPLETED | OUTPATIENT
Start: 2024-04-09 | End: 2024-04-09

## 2024-04-09 RX ORDER — MAGNESIUM SULFATE HEPTAHYDRATE 40 MG/ML
2 INJECTION, SOLUTION INTRAVENOUS ONCE
Status: COMPLETED | OUTPATIENT
Start: 2024-04-09 | End: 2024-04-09

## 2024-04-09 RX ORDER — ONDANSETRON 4 MG/1
4 TABLET, FILM COATED ORAL EVERY 12 HOURS PRN
Qty: 12 TABLET | Refills: 0 | Status: SHIPPED | OUTPATIENT
Start: 2024-04-09

## 2024-04-09 RX ADMIN — POTASSIUM & SODIUM PHOSPHATES POWDER PACK 280-160-250 MG 2 PACKET: 280-160-250 PACK at 12:04

## 2024-04-09 RX ADMIN — MAGNESIUM SULFATE HEPTAHYDRATE 2 G: 40 INJECTION, SOLUTION INTRAVENOUS at 11:06

## 2024-04-09 RX ADMIN — SODIUM CHLORIDE 1000 ML: 0.9 INJECTION, SOLUTION INTRAVENOUS at 09:33

## 2024-04-09 RX ADMIN — POTASSIUM CHLORIDE 40 MEQ: 1500 TABLET, EXTENDED RELEASE ORAL at 11:04

## 2024-04-09 RX ADMIN — ONDANSETRON 4 MG: 2 INJECTION INTRAMUSCULAR; INTRAVENOUS at 09:17

## 2024-04-09 RX ADMIN — DICYCLOMINE HYDROCHLORIDE 20 MG: 20 TABLET ORAL at 11:04

## 2024-04-09 NOTE — ED PROVIDER NOTES
History  Chief Complaint   Patient presents with    Diarrhea     Weakness and generally ill over the weekend. Yesterday began with diarrhea. Reports hx of hypokalemia and hypocalcemia, concerned for same.       Patient is a 68-year-old female past medical history hypertension, SIADH, hyponatremia, anxiety, alcohol abuse presenting to the ED for complaints of diarrhea that began yesterday.  She describes the diarrhea as black and watery, however denies any bright red blood.   Patient reports she takes iron supplements daily.  Patient also complains of associated crampy lower abdominal pain, loss of appetite, and nausea.  Patient reports 3 nights ago, she was out to eat with her brother and had x 2 beers followed by starting to feel fatigued the next morning.  Patient reports she did vomit her breakfast x1 this morning, denies hematemesis.  Prior to the diarrhea that began yesterday, patient reports her bowel habits usually are 1-2 formed brown stools per day.      Diarrhea  Associated symptoms: abdominal pain (minimal, crampy) and vomiting    Associated symptoms: no chills, no fever and no headaches        Prior to Admission Medications   Prescriptions Last Dose Informant Patient Reported? Taking?   amLODIPine (NORVASC) 10 mg tablet   Yes No   Sig: Take 10 mg by mouth daily   citalopram (CeleXA) 40 mg tablet 4/9/2024  Yes Yes   Sig: Take 40 mg by mouth daily   metoprolol succinate (TOPROL-XL) 25 mg 24 hr tablet   Yes No   Sig: Take 25 mg by mouth Three times a day   pantoprazole (PROTONIX) 40 mg tablet   Yes No   Sig: Take 1 tablet by mouth daily   sodium chloride 1 g tablet   No No   Sig: Take 3 tablets (3 g total) by mouth 4 (four) times a day (with meals and at bedtime)      Facility-Administered Medications: None       History reviewed. No pertinent past medical history.    Past Surgical History:   Procedure Laterality Date    ABDOMINAL SURGERY      EYE SURGERY      JOINT REPLACEMENT         History reviewed. No  pertinent family history.  I have reviewed and agree with the history as documented.    E-Cigarette/Vaping    E-Cigarette Use Never User      E-Cigarette/Vaping Substances     Social History     Tobacco Use    Smoking status: Never    Smokeless tobacco: Never   Vaping Use    Vaping status: Never Used   Substance Use Topics    Alcohol use: Yes     Comment: social    Drug use: Never       Review of Systems   Constitutional:  Positive for fatigue. Negative for chills and fever.   Respiratory:  Negative for chest tightness and shortness of breath.    Cardiovascular:  Negative for chest pain.   Gastrointestinal:  Positive for abdominal pain (minimal, crampy), diarrhea, nausea and vomiting. Negative for abdominal distention and blood in stool.   Neurological:  Negative for headaches.   All other systems reviewed and are negative.      Physical Exam  Physical Exam  Vitals and nursing note reviewed.   Constitutional:       General: She is not in acute distress.     Appearance: She is obese. She is not toxic-appearing.   HENT:      Head: Normocephalic and atraumatic.      Right Ear: External ear normal.      Left Ear: External ear normal.      Nose: Nose normal. No rhinorrhea.      Mouth/Throat:      Mouth: Mucous membranes are moist.   Eyes:      Extraocular Movements: Extraocular movements intact.      Pupils: Pupils are equal, round, and reactive to light.   Cardiovascular:      Rate and Rhythm: Regular rhythm. Tachycardia present.      Pulses: Normal pulses.      Heart sounds: Murmur heard.      No friction rub. No gallop.   Pulmonary:      Effort: Pulmonary effort is normal.      Breath sounds: Normal breath sounds.   Abdominal:      General: Abdomen is flat. Bowel sounds are normal. There is no distension.      Palpations: Abdomen is soft.      Tenderness: There is no abdominal tenderness. There is no guarding.   Skin:     General: Skin is warm and dry.      Capillary Refill: Capillary refill takes less than 2 seconds.    Neurological:      General: No focal deficit present.      Mental Status: She is alert and oriented to person, place, and time.         Vital Signs  ED Triage Vitals [04/09/24 0850]   Temperature Pulse Respirations Blood Pressure SpO2   98.2 °F (36.8 °C) (!) 126 18 162/77 96 %      Temp Source Heart Rate Source Patient Position - Orthostatic VS BP Location FiO2 (%)   Temporal Monitor Sitting Left arm --      Pain Score       3           Vitals:    04/09/24 0930 04/09/24 1000 04/09/24 1130 04/09/24 1209   BP: 133/99 133/70 121/66 129/66   Pulse: (!) 119 101 (!) 111 (!) 108   Patient Position - Orthostatic VS: Sitting Sitting Sitting Sitting         Visual Acuity      ED Medications  Medications   ondansetron (ZOFRAN) injection 4 mg (4 mg Intravenous Given 4/9/24 0917)   sodium chloride 0.9 % bolus 1,000 mL (0 mL Intravenous Stopped 4/9/24 1103)   potassium chloride (Klor-Con M20) CR tablet 40 mEq (40 mEq Oral Given 4/9/24 1104)   magnesium sulfate 2 g/50 mL IVPB (premix) 2 g (0 g Intravenous Stopped 4/9/24 1155)   dicyclomine (BENTYL) tablet 20 mg (20 mg Oral Given 4/9/24 1104)   potassium-sodium phosphates (PHOS-NAK) packet 2 packet (2 packets Oral Given 4/9/24 1204)       Diagnostic Studies  Results Reviewed       Procedure Component Value Units Date/Time    UA w Reflex to Microscopic w Reflex to Culture [[id number]] Collected: 04/09/24 1108    Lab Status: Final result Specimen: Urine, Clean Catch Updated: 04/09/24 1121     Color, UA Colorless     Clarity, UA Clear     Specific Gravity, UA 1.009     pH, UA 5.5     Leukocytes, UA Negative     Nitrite, UA Negative     Protein, UA Negative mg/dl      Glucose, UA Negative mg/dl      Ketones, UA Negative mg/dl      Urobilinogen, UA <2.0 mg/dl      Bilirubin, UA Negative     Occult Blood, UA Negative    Phosphorus [[id number]]  (Abnormal) Collected: 04/09/24 0917    Lab Status: Final result Specimen: Blood from Arm, Left Updated: 04/09/24 1031     Phosphorus 2.2 mg/dL      Magnesium [[id number]]  (Abnormal) Collected: 04/09/24 0917    Lab Status: Final result Specimen: Blood from Arm, Left Updated: 04/09/24 1031     Magnesium 1.6 mg/dL     Lipase [[id number]]  (Normal) Collected: 04/09/24 0917    Lab Status: Final result Specimen: Blood from Arm, Left Updated: 04/09/24 0939     Lipase 29 u/L     CMP [[id number]]  (Abnormal) Collected: 04/09/24 0917    Lab Status: Final result Specimen: Blood from Arm, Left Updated: 04/09/24 0939     Sodium 137 mmol/L      Potassium 3.5 mmol/L      Chloride 104 mmol/L      CO2 19 mmol/L      ANION GAP 14 mmol/L      BUN 9 mg/dL      Creatinine 0.84 mg/dL      Glucose 132 mg/dL      Calcium 9.3 mg/dL      AST 22 U/L      ALT 16 U/L      Alkaline Phosphatase 60 U/L      Total Protein 7.5 g/dL      Albumin 4.8 g/dL      Total Bilirubin 0.59 mg/dL      eGFR 72 ml/min/1.73sq m     Narrative:      [employer] guidelines for Chronic Kidney Disease (CKD):     Stage 1 with normal or high GFR (GFR > 90 mL/min/1.73 square meters)    Stage 2 Mild CKD (GFR = 60-89 mL/min/1.73 square meters)    Stage 3A Moderate CKD (GFR = 45-59 mL/min/1.73 square meters)    Stage 3B Moderate CKD (GFR = 30-44 mL/min/1.73 square meters)    Stage 4 Severe CKD (GFR = 15-29 mL/min/1.73 square meters)    Stage 5 End Stage CKD (GFR <15 mL/min/1.73 square meters)  Note: GFR calculation is accurate only with a steady state creatinine    CBC and differential [[id number]]  (Abnormal) Collected: 04/09/24 0917    Lab Status: Final result Specimen: Blood from Arm, Left Updated: 04/09/24 0923     WBC 4.55 Thousand/uL      RBC 3.96 Million/uL      Hemoglobin 13.9 g/dL      Hematocrit 40.3 %       fL      MCH 35.1 pg      MCHC 34.5 g/dL      RDW 13.7 %      MPV 9.2 fL      Platelets 245 Thousands/uL      nRBC 0 /100 WBCs      Segmented % 45 %      Immature Grans % 1 %      Lymphocytes % 42 %      Monocytes % 11 %      Eosinophils Relative 0 %      Basophils Relative 1 %       Absolute Neutrophils 1.99 Thousands/µL      Absolute Immature Grans 0.03 Thousand/uL      Absolute Lymphocytes 1.93 Thousands/µL      Absolute Monocytes 0.52 Thousand/µL      Eosinophils Absolute 0.02 Thousand/µL      Basophils Absolute 0.06 Thousands/µL                    No orders to display              Procedures  Procedures         ED Course  ED Course as of 04/09/24 1259   Tue Apr 09, 2024   0905 ECG 12 lead  Sinus tachycardia, rate 113   0925 CBC and differential(!)  No evidence of leukocytosis or anemia   1124 Magnesium(!)  Patient currently getting 2 g mg IV now   1124 CMP(!)  Reviewed, patient ordered KCl 40 M EQ oral   1132 Patient reevaluated, feels much improved.  Denies diarrhea since senting to the ED, nausea resolved.                               SBIRT 22yo+      Flowsheet Row Most Recent Value   Initial Alcohol Screen: US AUDIT-C     1. How often do you have a drink containing alcohol? 1 Filed at: 04/09/2024 0859   2. How many drinks containing alcohol do you have on a typical day you are drinking?  0 Filed at: 04/09/2024 0859   3a. Male UNDER 65: How often do you have five or more drinks on one occasion? 0 Filed at: 04/09/2024 0859   3b. FEMALE Any Age, or MALE 65+: How often do you have 4 or more drinks on one occassion? 0 Filed at: 04/09/2024 0859   Audit-C Score 1 Filed at: 04/09/2024 0859   EVELIO: How many times in the past year have you...    Used an illegal drug or used a prescription medication for non-medical reasons? Never Filed at: 04/09/2024 0859                      Medical Decision Making  Patient is a 68-year-old female past medical history hypertension, SIADH, hyponatremia, anxiety, alcohol abuse presenting to the ED for complaints of diarrhea that began yesterday. On exam, abdomen is soft and nontender to palpation.  Labs significant for hypokalemia hypomagnesia, hypophosphatemia.  Electrolytes replaced in ED. patient given 1 L NaCl 0.9% fluid bolus over 2 hours.  Patient  giving Bentyl 20 mg once and Zofran 4 mg IV.  Upon reevaluation, patient feels much improved denies continued diarrhea while in the ED and nausea resolved.  Differential diagnosis includes viral gastroenteritis, IBS, food intolerance.  Patient stable for discharge.  Will send patient with prescription for Bentyl 20 mg as needed abd cramping/diarrhea and Zofran 4mg prn nausea. Encourage p.o. hydration at home.  Discussed ED return precautions including worsening symptoms and/or new symptoms including but not limited to chest pain, shortness of breath.  Patient understanding and agreeable to plan.    Amount and/or Complexity of Data Reviewed  Labs: ordered. Decision-making details documented in ED Course.  ECG/medicine tests:  Decision-making details documented in ED Course.    Risk  OTC drugs.  Prescription drug management.             Disposition  Final diagnoses:   Diarrhea, unspecified type     Time reflects when diagnosis was documented in both MDM as applicable and the Disposition within this note       Time User Action Codes Description Comment    4/9/2024 12:07 PM Amando Santa Add [R19.7] Diarrhea, unspecified type           ED Disposition       ED Disposition   Discharge    Condition   Stable    Date/Time   Tue Apr 9, 2024 1207    Comment   Jazmin Jean discharge to home/self care.                   Follow-up Information       Follow up With Specialties Details Why Contact Info Additional Information    Deidra Verma NP Nurse Practitioner Call  As needed, If symptoms worsen [address] [facility]  [place] [address] [phone]       [facility] Emergency Medicine Go to  If symptoms worsen, new symptoms including but not limited to chest pain or shortness of breath [address] [facility], [place] [address] [phone] [facility], [address], [place], [address]            Discharge  Medication List as of 4/9/2024 12:14 PM        START taking these medications    Details   dicyclomine (BENTYL) 20 mg tablet Take 1 tablet (20 mg total) by mouth once as needed (abdominal cramping and diarrhea) for up to 20 doses, Starting Tue 4/9/2024, Normal      ondansetron (ZOFRAN) 4 mg tablet Take 1 tablet (4 mg total) by mouth every 12 (twelve) hours as needed for nausea or vomiting, Starting Tue 4/9/2024, Normal           CONTINUE these medications which have NOT CHANGED    Details   citalopram (CeleXA) 40 mg tablet Take 40 mg by mouth daily, Historical Med      amLODIPine (NORVASC) 10 mg tablet Take 10 mg by mouth daily, Starting Tue 9/12/2023, Historical Med      metoprolol succinate (TOPROL-XL) 25 mg 24 hr tablet Take 25 mg by mouth Three times a day, Starting Tue 1/2/2024, Until Mon 4/1/2024, Historical Med      pantoprazole (PROTONIX) 40 mg tablet Take 1 tablet by mouth daily, Starting u 8/17/2023, Historical Med      sodium chloride 1 g tablet Take 3 tablets (3 g total) by mouth 4 (four) times a day (with meals and at bedtime), Starting Mon 1/29/2024, Normal             No discharge procedures on file.    PDMP Review         Value Time User    PDMP Reviewed  Yes 1/25/2024  9:26 PM RANDAL Anders            ED Provider  Electronically Signed by             Amando Santa PA-C  04/09/24 4777